# Patient Record
Sex: FEMALE | Race: OTHER | HISPANIC OR LATINO
[De-identification: names, ages, dates, MRNs, and addresses within clinical notes are randomized per-mention and may not be internally consistent; named-entity substitution may affect disease eponyms.]

---

## 2018-08-01 ENCOUNTER — APPOINTMENT (OUTPATIENT)
Dept: RHEUMATOLOGY | Facility: CLINIC | Age: 73
End: 2018-08-01
Payer: MEDICARE

## 2018-08-01 VITALS
RESPIRATION RATE: 17 BRPM | HEIGHT: 61 IN | HEART RATE: 62 BPM | OXYGEN SATURATION: 96 % | SYSTOLIC BLOOD PRESSURE: 138 MMHG | TEMPERATURE: 99.1 F | DIASTOLIC BLOOD PRESSURE: 60 MMHG | BODY MASS INDEX: 27.19 KG/M2 | WEIGHT: 144 LBS

## 2018-08-01 DIAGNOSIS — F17.200 NICOTINE DEPENDENCE, UNSPECIFIED, UNCOMPLICATED: ICD-10-CM

## 2018-08-01 DIAGNOSIS — Z87.39 PERSONAL HISTORY OF OTHER DISEASES OF THE MUSCULOSKELETAL SYSTEM AND CONNECTIVE TISSUE: ICD-10-CM

## 2018-08-01 DIAGNOSIS — Z86.79 PERSONAL HISTORY OF OTHER DISEASES OF THE CIRCULATORY SYSTEM: ICD-10-CM

## 2018-08-01 DIAGNOSIS — Z86.39 PERSONAL HISTORY OF OTHER ENDOCRINE, NUTRITIONAL AND METABOLIC DISEASE: ICD-10-CM

## 2018-08-01 PROCEDURE — 99205 OFFICE O/P NEW HI 60 MIN: CPT | Mod: 25

## 2018-08-01 PROCEDURE — 36415 COLL VENOUS BLD VENIPUNCTURE: CPT

## 2018-08-01 RX ORDER — HYDROCHLOROTHIAZIDE 25 MG/1
25 TABLET ORAL
Refills: 0 | Status: ACTIVE | COMMUNITY

## 2018-08-01 RX ORDER — ADHESIVE TAPE 3"X 2.3 YD
50 MCG TAPE, NON-MEDICATED TOPICAL
Refills: 0 | Status: ACTIVE | COMMUNITY

## 2018-08-01 RX ORDER — ATORVASTATIN CALCIUM 20 MG/1
20 TABLET, FILM COATED ORAL
Refills: 0 | Status: ACTIVE | COMMUNITY

## 2018-08-01 RX ORDER — LEVOTHYROXINE SODIUM 50 MCG
50 TABLET ORAL
Refills: 0 | Status: ACTIVE | COMMUNITY

## 2018-09-26 ENCOUNTER — APPOINTMENT (OUTPATIENT)
Dept: RHEUMATOLOGY | Facility: CLINIC | Age: 73
End: 2018-09-26
Payer: MEDICARE

## 2018-09-26 VITALS
HEIGHT: 61 IN | OXYGEN SATURATION: 97 % | SYSTOLIC BLOOD PRESSURE: 110 MMHG | TEMPERATURE: 99 F | HEART RATE: 68 BPM | DIASTOLIC BLOOD PRESSURE: 70 MMHG | WEIGHT: 142 LBS | RESPIRATION RATE: 16 BRPM | BODY MASS INDEX: 26.81 KG/M2

## 2018-09-26 DIAGNOSIS — M77.11 LATERAL EPICONDYLITIS, RIGHT ELBOW: ICD-10-CM

## 2018-09-26 DIAGNOSIS — M77.12 LATERAL EPICONDYLITIS, RIGHT ELBOW: ICD-10-CM

## 2018-09-26 PROCEDURE — 99214 OFFICE O/P EST MOD 30 MIN: CPT

## 2018-10-15 ENCOUNTER — RX RENEWAL (OUTPATIENT)
Age: 73
End: 2018-10-15

## 2019-01-24 LAB
A PHAGOCYTOPH IGG TITR SER IF: NORMAL TITER
ALBUMIN SERPL ELPH-MCNC: 5 G/DL
ALP BLD-CCNC: 92 U/L
ALT SERPL-CCNC: 14 U/L
ANA PAT FLD IF-IMP: ABNORMAL
ANA SER IF-ACNC: ABNORMAL
ANION GAP SERPL CALC-SCNC: 20 MMOL/L
AST SERPL-CCNC: 15 U/L
B BURGDOR AB SER QL IA: NEGATIVE
B BURGDOR AB SER-IMP: NEGATIVE
B BURGDOR IGM PATRN SER IB-IMP: NEGATIVE
B BURGDOR18/20KD IGM SER QL IB: NORMAL
B BURGDOR18KD IGG SER QL IB: NORMAL
B BURGDOR23KD IGG SER QL IB: NORMAL
B BURGDOR23KD IGM SER QL IB: NORMAL
B BURGDOR28KD AB SER QL IB: NORMAL
B BURGDOR28KD IGG SER QL IB: NORMAL
B BURGDOR30KD AB SER QL IB: NORMAL
B BURGDOR30KD IGG SER QL IB: NORMAL
B BURGDOR31KD IGG SER QL IB: NORMAL
B BURGDOR31KD IGM SER QL IB: NORMAL
B BURGDOR39KD IGG SER QL IB: NORMAL
B BURGDOR39KD IGM SER QL IB: PRESENT
B BURGDOR41KD IGG SER QL IB: PRESENT
B BURGDOR41KD IGM SER QL IB: NORMAL
B BURGDOR45KD AB SER QL IB: NORMAL
B BURGDOR45KD IGG SER QL IB: NORMAL
B BURGDOR58KD AB SER QL IB: NORMAL
B BURGDOR58KD IGG SER QL IB: NORMAL
B BURGDOR66KD IGG SER QL IB: NORMAL
B BURGDOR66KD IGM SER QL IB: NORMAL
B BURGDOR93KD IGG SER QL IB: NORMAL
B BURGDOR93KD IGM SER QL IB: NORMAL
B MICROTI IGG TITR SER: NORMAL TITER
BASOPHILS # BLD AUTO: 0.02 K/UL
BASOPHILS NFR BLD AUTO: 0.2 %
BILIRUB SERPL-MCNC: 0.9 MG/DL
BUN SERPL-MCNC: 18 MG/DL
CALCIUM SERPL-MCNC: 10.5 MG/DL
CCP AB SER IA-ACNC: <8 UNITS
CHLORIDE SERPL-SCNC: 100 MMOL/L
CO2 SERPL-SCNC: 24 MMOL/L
CREAT SERPL-MCNC: 0.69 MG/DL
CRP SERPL-MCNC: 0.15 MG/DL
E CHAFFEENSIS IGG TITR SER IF: NORMAL TITER
EOSINOPHIL # BLD AUTO: 0.13 K/UL
EOSINOPHIL NFR BLD AUTO: 1.3 %
ERYTHROCYTE [SEDIMENTATION RATE] IN BLOOD BY WESTERGREN METHOD: 8 MM/HR
GLUCOSE SERPL-MCNC: 93 MG/DL
HCT VFR BLD CALC: 46.6 %
HGB BLD-MCNC: 15.2 G/DL
IMM GRANULOCYTES NFR BLD AUTO: 0.2 %
LYMPHOCYTES # BLD AUTO: 2.48 K/UL
LYMPHOCYTES NFR BLD AUTO: 25.6 %
MAN DIFF?: NORMAL
MCHC RBC-ENTMCNC: 29.3 PG
MCHC RBC-ENTMCNC: 32.6 GM/DL
MCV RBC AUTO: 89.8 FL
MONOCYTES # BLD AUTO: 0.76 K/UL
MONOCYTES NFR BLD AUTO: 7.8 %
NEUTROPHILS # BLD AUTO: 6.28 K/UL
NEUTROPHILS NFR BLD AUTO: 64.9 %
PLATELET # BLD AUTO: 179 K/UL
POTASSIUM SERPL-SCNC: 4.6 MMOL/L
PROT SERPL-MCNC: 7.2 G/DL
RBC # BLD: 5.19 M/UL
RBC # FLD: 14 %
RF+CCP IGG SER-IMP: NEGATIVE
RHEUMATOID FACT SER QL: <10 IU/ML
SODIUM SERPL-SCNC: 144 MMOL/L
URATE SERPL-MCNC: 5 MG/DL
WBC # FLD AUTO: 9.69 K/UL

## 2019-03-20 ENCOUNTER — APPOINTMENT (OUTPATIENT)
Dept: RHEUMATOLOGY | Facility: CLINIC | Age: 74
End: 2019-03-20
Payer: MEDICARE

## 2019-03-20 VITALS
RESPIRATION RATE: 17 BRPM | DIASTOLIC BLOOD PRESSURE: 68 MMHG | HEIGHT: 61 IN | SYSTOLIC BLOOD PRESSURE: 110 MMHG | BODY MASS INDEX: 26.43 KG/M2 | TEMPERATURE: 99.8 F | WEIGHT: 140 LBS | HEART RATE: 96 BPM | OXYGEN SATURATION: 97 %

## 2019-03-20 DIAGNOSIS — G60.9 HEREDITARY AND IDIOPATHIC NEUROPATHY, UNSPECIFIED: ICD-10-CM

## 2019-03-20 DIAGNOSIS — M79.10 MYALGIA, UNSPECIFIED SITE: ICD-10-CM

## 2019-03-20 DIAGNOSIS — M79.7 FIBROMYALGIA: ICD-10-CM

## 2019-03-20 DIAGNOSIS — G43.709 CHRONIC MIGRAINE W/OUT AURA, NOT INTRACTABLE, W/OUT STATUS MIGRAINOSUS: ICD-10-CM

## 2019-03-20 PROCEDURE — 99214 OFFICE O/P EST MOD 30 MIN: CPT

## 2019-03-20 RX ORDER — METOPROLOL TARTRATE 25 MG/1
25 TABLET, FILM COATED ORAL
Refills: 0 | Status: DISCONTINUED | COMMUNITY
End: 2019-03-20

## 2019-03-20 RX ORDER — KETOPHENE
20 KIT TWICE DAILY
Qty: 1 | Refills: 0 | Status: DISCONTINUED | COMMUNITY
Start: 2018-09-26 | End: 2019-03-20

## 2019-03-20 RX ORDER — DULOXETINE HYDROCHLORIDE 30 MG/1
30 CAPSULE, DELAYED RELEASE PELLETS ORAL
Qty: 30 | Refills: 2 | Status: DISCONTINUED | COMMUNITY
Start: 2018-08-01 | End: 2019-03-20

## 2019-03-20 RX ORDER — MELOXICAM 15 MG/1
15 TABLET ORAL
Refills: 0 | Status: DISCONTINUED | COMMUNITY
End: 2019-03-20

## 2019-06-19 ENCOUNTER — APPOINTMENT (OUTPATIENT)
Dept: RHEUMATOLOGY | Facility: CLINIC | Age: 74
End: 2019-06-19
Payer: MEDICARE

## 2019-06-19 VITALS
WEIGHT: 140 LBS | SYSTOLIC BLOOD PRESSURE: 120 MMHG | TEMPERATURE: 98 F | OXYGEN SATURATION: 97 % | DIASTOLIC BLOOD PRESSURE: 46 MMHG | HEART RATE: 86 BPM | RESPIRATION RATE: 17 BRPM | BODY MASS INDEX: 26.43 KG/M2 | HEIGHT: 61 IN

## 2019-06-19 DIAGNOSIS — R53.82 CHRONIC FATIGUE, UNSPECIFIED: ICD-10-CM

## 2019-06-19 DIAGNOSIS — R10.31 RIGHT LOWER QUADRANT PAIN: ICD-10-CM

## 2019-06-19 DIAGNOSIS — M15.9 POLYOSTEOARTHRITIS, UNSPECIFIED: ICD-10-CM

## 2019-06-19 DIAGNOSIS — M79.7 FIBROMYALGIA: ICD-10-CM

## 2019-06-19 DIAGNOSIS — M89.8X9 OTHER SPECIFIED DISORDERS OF BONE, UNSPECIFIED SITE: ICD-10-CM

## 2019-06-19 DIAGNOSIS — M47.812 SPONDYLOSIS W/OUT MYELOPATHY OR RADICULOPATHY, CERVICAL REGION: ICD-10-CM

## 2019-06-19 PROCEDURE — 99214 OFFICE O/P EST MOD 30 MIN: CPT

## 2019-06-19 RX ORDER — CELECOXIB 200 MG/1
200 CAPSULE ORAL TWICE DAILY
Qty: 60 | Refills: 2 | Status: DISCONTINUED | COMMUNITY
Start: 2019-03-20 | End: 2019-06-19

## 2019-06-19 RX ORDER — DICLOFENAC SODIUM 10 MG/G
1 GEL TOPICAL TWICE DAILY
Qty: 1 | Refills: 2 | Status: ACTIVE | COMMUNITY
Start: 2019-06-19 | End: 1900-01-01

## 2019-07-19 RX ORDER — CYCLOBENZAPRINE HYDROCHLORIDE 10 MG/1
10 TABLET, FILM COATED ORAL
Qty: 60 | Refills: 2 | Status: ACTIVE | COMMUNITY
Start: 2019-06-19

## 2019-08-02 ENCOUNTER — APPOINTMENT (OUTPATIENT)
Dept: RHEUMATOLOGY | Facility: CLINIC | Age: 74
End: 2019-08-02

## 2019-08-21 ENCOUNTER — APPOINTMENT (OUTPATIENT)
Dept: RHEUMATOLOGY | Facility: CLINIC | Age: 74
End: 2019-08-21

## 2019-10-02 ENCOUNTER — APPOINTMENT (OUTPATIENT)
Dept: RHEUMATOLOGY | Facility: CLINIC | Age: 74
End: 2019-10-02

## 2019-10-25 ENCOUNTER — APPOINTMENT (OUTPATIENT)
Dept: RHEUMATOLOGY | Facility: CLINIC | Age: 74
End: 2019-10-25

## 2019-11-26 ENCOUNTER — APPOINTMENT (OUTPATIENT)
Dept: RHEUMATOLOGY | Facility: CLINIC | Age: 74
End: 2019-11-26

## 2020-10-03 DIAGNOSIS — Z01.818 ENCOUNTER FOR OTHER PREPROCEDURAL EXAMINATION: ICD-10-CM

## 2020-10-04 ENCOUNTER — APPOINTMENT (OUTPATIENT)
Dept: DISASTER EMERGENCY | Facility: CLINIC | Age: 75
End: 2020-10-04

## 2020-10-05 LAB — SARS-COV-2 N GENE NPH QL NAA+PROBE: NOT DETECTED

## 2020-10-06 NOTE — H&P PST ADULT - REASON FOR ADMISSION
Peripheral Angiogram with Dr. Gaspar Peripheral Angiogram with Dr. Gaspar due to severe claudication and pain

## 2020-10-06 NOTE — H&P PST ADULT - NSICDXPASTMEDICALHX_GEN_ALL_CORE_FT
PAST MEDICAL HISTORY:  Dyslipidemia     Fibromyalgia     GERD (gastroesophageal reflux disease)     Hypertension     Hypothyroid     Subclavian artery stenosis

## 2020-10-06 NOTE — H&P PST ADULT - ASSESSMENT
74 F with a significant PMH of hypertension, peripheral vascular disease, subclavian artery stenosis, hypothyroidism, dyslipidemia, fibromyalgia, GERD who presents today for anticipated peripheral angiogram with Dr. Gaspar.     - Consent to be obtained by physician  - 12 Lead EKG, Labs and imaging to be reviewed  - Procedure explained at length.  Risks v benefits reviewed.  All questions answered.

## 2020-10-06 NOTE — H&P PST ADULT - HISTORY OF PRESENT ILLNESS
Narrative:   This is a 74 F with a significant PMH of hypertension, peripheral vascular disease, subclavian artery stenosis, hypothyroidism, dyslipidemia, fibromyalgia, GERD who presents today for anticipated peripheral angiogram with Dr. Gaspar.  She was seen on 3/2020 by Dr. Gaspar for diagnostic peripheral angiogram and was found to have bilateral iliac ostial stenosis (bifurcation disease), patent femoral arteries and bilateral 3 vessel runoff.  Recommendation post-procedure was to pursue endovascular intervention of bilateral common iliac arteries (bifurcation stenting).      She had peripheral artrial duplex scanning performed 1/2020 which yielded biphasic flow of the R CFA suggesting 20-49% stenosis, popliteal artery with monophasic flow and mid posterior tibial with monophasic pattern and continuous flow in diastole which suggested significant stenosis.  The left CFA had monophasic flow with continuous diastolic component suggesting 50% stenosis, deep femoral artery, superficial femoral artery and popliteal artery with 20-49% stenosis and posterior tibial waveform with significant stenosis.      Venous reflux scan on 12/18/2019 with reflux in mid and distal right saphenous vein and L greater saphenous vein reflux proximally.      ASA  Mallampati  BRA  GFR   Narrative:   This is a 74 F with a significant PMH of hypertension, peripheral vascular disease, subclavian artery stenosis, hypothyroidism, dyslipidemia, fibromyalgia, GERD who presents today for anticipated peripheral angiogram with Dr. Gaspar.  She was seen on 3/2020 by Dr. Gaspar for diagnostic peripheral angiogram and was found to have bilateral iliac ostial stenosis (bifurcation disease), patent femoral arteries and bilateral 3 vessel runoff.  Recommendation post-procedure was to pursue endovascular intervention of bilateral common iliac arteries (bifurcation stenting).    Pt presents today with c/o progressive claudication and pain with ambulation normo- tensive  and appears comfortable   She denies fever, chills, diarrhea burning on urination . She continues to smoke 1/2 PPD  and has a chronic baseline cough  with no sputum production. She reports no chest pain, SOB , leg edema , black or blood in stool. She is allergic to ASA with throat swelling . As per chart review she was on Plavix but does not take now   with unclear reason why.           She had peripheral artrial duplex scanning performed 1/2020 which yielded biphasic flow of the R CFA suggesting 20-49% stenosis, popliteal artery with monophasic flow and mid posterior tibial with monophasic pattern and continuous flow in diastole which suggested significant stenosis.  The left CFA had monophasic flow with continuous diastolic component suggesting 50% stenosis, deep femoral artery, superficial femoral artery and popliteal artery with 20-49% stenosis and posterior tibial waveform with significant stenosis.      Venous reflux scan on 12/18/2019 with reflux in mid and distal right saphenous vein and L greater saphenous vein reflux proximally.      ASA2  Mallampati2  BRA 2.5   GFR 91

## 2020-10-07 ENCOUNTER — TRANSCRIPTION ENCOUNTER (OUTPATIENT)
Age: 75
End: 2020-10-07

## 2020-10-07 ENCOUNTER — OUTPATIENT (OUTPATIENT)
Dept: OUTPATIENT SERVICES | Facility: HOSPITAL | Age: 75
LOS: 1 days | Discharge: HOME | End: 2020-10-07
Payer: MEDICARE

## 2020-10-07 VITALS
OXYGEN SATURATION: 95 % | RESPIRATION RATE: 18 BRPM | SYSTOLIC BLOOD PRESSURE: 138 MMHG | DIASTOLIC BLOOD PRESSURE: 81 MMHG | HEART RATE: 59 BPM | TEMPERATURE: 98 F

## 2020-10-07 VITALS
HEART RATE: 66 BPM | RESPIRATION RATE: 18 BRPM | DIASTOLIC BLOOD PRESSURE: 68 MMHG | OXYGEN SATURATION: 99 % | SYSTOLIC BLOOD PRESSURE: 154 MMHG

## 2020-10-07 DIAGNOSIS — I73.9 PERIPHERAL VASCULAR DISEASE, UNSPECIFIED: ICD-10-CM

## 2020-10-07 LAB
ABO RH CONFIRMATION: SIGNIFICANT CHANGE UP
ALBUMIN SERPL ELPH-MCNC: 4.5 G/DL — SIGNIFICANT CHANGE UP (ref 3.3–5.2)
ALP SERPL-CCNC: 96 U/L — SIGNIFICANT CHANGE UP (ref 40–120)
ALT FLD-CCNC: 13 U/L — SIGNIFICANT CHANGE UP
ANION GAP SERPL CALC-SCNC: 11 MMOL/L — SIGNIFICANT CHANGE UP (ref 5–17)
APTT BLD: 31.5 SEC — SIGNIFICANT CHANGE UP (ref 27.5–35.5)
AST SERPL-CCNC: 13 U/L — SIGNIFICANT CHANGE UP
BASOPHILS # BLD AUTO: 0.05 K/UL — SIGNIFICANT CHANGE UP (ref 0–0.2)
BASOPHILS NFR BLD AUTO: 0.4 % — SIGNIFICANT CHANGE UP (ref 0–2)
BILIRUB SERPL-MCNC: 1 MG/DL — SIGNIFICANT CHANGE UP (ref 0.4–2)
BLD GP AB SCN SERPL QL: SIGNIFICANT CHANGE UP
BUN SERPL-MCNC: 21 MG/DL — HIGH (ref 8–20)
CALCIUM SERPL-MCNC: 9.8 MG/DL — SIGNIFICANT CHANGE UP (ref 8.6–10.2)
CHLORIDE SERPL-SCNC: 103 MMOL/L — SIGNIFICANT CHANGE UP (ref 98–107)
CO2 SERPL-SCNC: 28 MMOL/L — SIGNIFICANT CHANGE UP (ref 22–29)
CREAT SERPL-MCNC: 0.6 MG/DL — SIGNIFICANT CHANGE UP (ref 0.5–1.3)
EOSINOPHIL # BLD AUTO: 0.16 K/UL — SIGNIFICANT CHANGE UP (ref 0–0.5)
EOSINOPHIL NFR BLD AUTO: 1.3 % — SIGNIFICANT CHANGE UP (ref 0–6)
GLUCOSE SERPL-MCNC: 107 MG/DL — HIGH (ref 70–99)
HCT VFR BLD CALC: 48.6 % — HIGH (ref 34.5–45)
HGB BLD-MCNC: 15.9 G/DL — HIGH (ref 11.5–15.5)
IMM GRANULOCYTES NFR BLD AUTO: 0.4 % — SIGNIFICANT CHANGE UP (ref 0–1.5)
INR BLD: 1.06 RATIO — SIGNIFICANT CHANGE UP (ref 0.88–1.16)
LYMPHOCYTES # BLD AUTO: 17.1 % — SIGNIFICANT CHANGE UP (ref 13–44)
LYMPHOCYTES # BLD AUTO: 2.13 K/UL — SIGNIFICANT CHANGE UP (ref 1–3.3)
MAGNESIUM SERPL-MCNC: 2.1 MG/DL — SIGNIFICANT CHANGE UP (ref 1.6–2.6)
MCHC RBC-ENTMCNC: 29.9 PG — SIGNIFICANT CHANGE UP (ref 27–34)
MCHC RBC-ENTMCNC: 32.7 GM/DL — SIGNIFICANT CHANGE UP (ref 32–36)
MCV RBC AUTO: 91.5 FL — SIGNIFICANT CHANGE UP (ref 80–100)
MONOCYTES # BLD AUTO: 1.01 K/UL — HIGH (ref 0–0.9)
MONOCYTES NFR BLD AUTO: 8.1 % — SIGNIFICANT CHANGE UP (ref 2–14)
NEUTROPHILS # BLD AUTO: 9.08 K/UL — HIGH (ref 1.8–7.4)
NEUTROPHILS NFR BLD AUTO: 72.7 % — SIGNIFICANT CHANGE UP (ref 43–77)
PLATELET # BLD AUTO: 204 K/UL — SIGNIFICANT CHANGE UP (ref 150–400)
POTASSIUM SERPL-MCNC: 3.9 MMOL/L — SIGNIFICANT CHANGE UP (ref 3.5–5.3)
POTASSIUM SERPL-SCNC: 3.9 MMOL/L — SIGNIFICANT CHANGE UP (ref 3.5–5.3)
PROT SERPL-MCNC: 7.2 G/DL — SIGNIFICANT CHANGE UP (ref 6.6–8.7)
PROTHROM AB SERPL-ACNC: 12.3 SEC — SIGNIFICANT CHANGE UP (ref 10.6–13.6)
RBC # BLD: 5.31 M/UL — HIGH (ref 3.8–5.2)
RBC # FLD: 13.7 % — SIGNIFICANT CHANGE UP (ref 10.3–14.5)
SODIUM SERPL-SCNC: 142 MMOL/L — SIGNIFICANT CHANGE UP (ref 135–145)
WBC # BLD: 12.48 K/UL — HIGH (ref 3.8–10.5)
WBC # FLD AUTO: 12.48 K/UL — HIGH (ref 3.8–10.5)

## 2020-10-07 PROCEDURE — 85610 PROTHROMBIN TIME: CPT

## 2020-10-07 PROCEDURE — C1725: CPT

## 2020-10-07 PROCEDURE — 86850 RBC ANTIBODY SCREEN: CPT

## 2020-10-07 PROCEDURE — 99152 MOD SED SAME PHYS/QHP 5/>YRS: CPT

## 2020-10-07 PROCEDURE — C1769: CPT

## 2020-10-07 PROCEDURE — 86900 BLOOD TYPING SEROLOGIC ABO: CPT

## 2020-10-07 PROCEDURE — 36415 COLL VENOUS BLD VENIPUNCTURE: CPT

## 2020-10-07 PROCEDURE — C1760: CPT

## 2020-10-07 PROCEDURE — 93010 ELECTROCARDIOGRAM REPORT: CPT

## 2020-10-07 PROCEDURE — 85730 THROMBOPLASTIN TIME PARTIAL: CPT

## 2020-10-07 PROCEDURE — 93005 ELECTROCARDIOGRAM TRACING: CPT

## 2020-10-07 PROCEDURE — C1876: CPT

## 2020-10-07 PROCEDURE — 83735 ASSAY OF MAGNESIUM: CPT

## 2020-10-07 PROCEDURE — C1887: CPT

## 2020-10-07 PROCEDURE — C1894: CPT

## 2020-10-07 PROCEDURE — 86901 BLOOD TYPING SEROLOGIC RH(D): CPT

## 2020-10-07 PROCEDURE — 80053 COMPREHEN METABOLIC PANEL: CPT

## 2020-10-07 PROCEDURE — 37221: CPT | Mod: 50

## 2020-10-07 PROCEDURE — 99153 MOD SED SAME PHYS/QHP EA: CPT

## 2020-10-07 PROCEDURE — 85025 COMPLETE CBC W/AUTO DIFF WBC: CPT

## 2020-10-07 RX ORDER — CLOTRIMAZOLE 10 MG
0 TROCHE MUCOUS MEMBRANE
Qty: 0 | Refills: 0 | DISCHARGE

## 2020-10-07 RX ORDER — CLOPIDOGREL BISULFATE 75 MG/1
1 TABLET, FILM COATED ORAL
Qty: 90 | Refills: 3
Start: 2020-10-07 | End: 2021-10-01

## 2020-10-07 RX ORDER — ACETAMINOPHEN 500 MG
1000 TABLET ORAL ONCE
Refills: 0 | Status: COMPLETED | OUTPATIENT
Start: 2020-10-07 | End: 2020-10-07

## 2020-10-07 RX ORDER — CLOPIDOGREL BISULFATE 75 MG/1
300 TABLET, FILM COATED ORAL ONCE
Refills: 0 | Status: COMPLETED | OUTPATIENT
Start: 2020-10-07 | End: 2020-10-07

## 2020-10-07 RX ORDER — CLOPIDOGREL BISULFATE 75 MG/1
75 TABLET, FILM COATED ORAL DAILY
Refills: 0 | Status: DISCONTINUED | OUTPATIENT
Start: 2020-10-08 | End: 2020-10-21

## 2020-10-07 RX ORDER — SOD,AMMONIUM,POTASSIUM LACTATE
1 CREAM (GRAM) TOPICAL
Qty: 0 | Refills: 0 | DISCHARGE

## 2020-10-07 RX ORDER — CLOPIDOGREL BISULFATE 75 MG/1
1 TABLET, FILM COATED ORAL
Qty: 0 | Refills: 0 | DISCHARGE

## 2020-10-07 RX ADMIN — CLOPIDOGREL BISULFATE 300 MILLIGRAM(S): 75 TABLET, FILM COATED ORAL at 09:41

## 2020-10-07 RX ADMIN — Medication 400 MILLIGRAM(S): at 14:23

## 2020-10-07 NOTE — PROGRESS NOTE ADULT - SUBJECTIVE AND OBJECTIVE BOX
Department of Cardiology                                                                  [facility]/[place] [address] [place] [address]                                                            Telephone: [phone]. Fax:[phone]                                                                                         PERIPHERAL NOTE     74yFemale S/P lower extremity angiography as stated below.  The patient denies chest pain, SOB, leg/foot pain or groin pain.    s/p Bilateral kissing stents Left Lower Extremity   Iliac: stent  Right Lower Extremity      Iliac: stent done via RFA and LFA tolerated well           HPI:This is a 74 F with a significant PMH of hypertension, peripheral vascular disease, subclavian artery stenosis, hypothyroidism, dyslipidemia, fibromyalgia, GERD who presents today for anticipated peripheral angiogram with Dr. Gaspar.  She was seen on 3/2020 by Dr. Gaspar for diagnostic peripheral angiogram and was found to have bilateral iliac ostial stenosis (bifurcation disease), patent femoral arteries and bilateral 3 vessel runoff.  Recommendation post-procedure was to pursue endovascular intervention of bilateral common iliac arteries (bifurcation stenting).    Pt presents today with c/o progressive claudication and pain with ambulation normo- tensive  and appears comfortable   She denies fever, chills, diarrhea burning on urination . She continues to smoke 1/2 PPD  and has a chronic baseline cough  with no sputum production. She reports no chest pain, SOB , leg edema , black or blood in stool. She is allergic to ASA with throat swelling . As per chart review she was on Plavix but does not take now   with unclear reason why.           She had peripheral artrial duplex scanning performed 1/2020 which yielded biphasic flow of the R CFA suggesting 20-49% stenosis, popliteal artery with monophasic flow and mid posterior tibial with monophasic pattern and continuous flow in diastole which suggested significant stenosis.  The left CFA had monophasic flow with continuous diastolic component suggesting 50% stenosis, deep femoral artery, superficial femoral artery and popliteal artery with 20-49% stenosis and posterior tibial waveform with significant stenosis.      Venous reflux scan on 12/18/2019 with reflux in mid and distal right saphenous vein and L greater saphenous vein reflux proximally.      A  General: Awake, alert, oriented, in no acute distress   Chest: CTA, S1, S2, RRR  Right Groin: Right femoral artery sheath, soft, no bleeding, no hematoma  Left femoral artery hemostasis with a  angio-seal   Extremities: No edema  Pulses:        Right: positive        Left: positive     Labs:                         15.9   12.48 )-----------( 204      ( 07 Oct 2020 08:00 )             48.6     10-07    142  |  103  |  21.0<H>  ----------------------------<  107<H>  3.9   |  28.0  |  0.60    Ca    9.8      07 Oct 2020 08:00  Mg     2.1     10-07    TPro  7.2  /  Alb  4.5  /  TBili  1.0  /  DBili  x   /  AST  13  /  ALT  13  /  AlkPhos  96  10-07    PT/INR - ( 07 Oct 2020 08:00 )   PT: 12.3 sec;   INR: 1.06 ratio         PTT - ( 07 Oct 2020 08:00 )  PTT:31.5 sec

## 2020-10-07 NOTE — PROGRESS NOTE ADULT - SUBJECTIVE AND OBJECTIVE BOX
NP sheath removal note     s/p Pt tolerated R femoral # 6    sheath removal , manual pressure held for  35   with good hemostasis, no evidence of bleeding area remains soft non- tender , no hematoma + peripheral pulses  Left femoral cath site hemostasis with Angio seal device arjun amout of bleeding around site ignacio pressure held and D - Stat applied with good results     T(C): 36.4 (10-07-20 @ 08:01), Max: 36.4 (10-07-20 @ 08:01)  HR: 50 (10-07-20 @ 14:00) (43 - 59)  BP: 147/65 (10-07-20 @ 14:00) (95/48 - 147/72)  RR: 16 (10-07-20 @ 14:00) (16 - 18)  SpO2: 100% (10-07-20 @ 14:00) (95% - 100%)    A/P PVD  with 2 stent to bilateral iliacs   - Maintain bedrest for 4    hour   - OOB with assistance   - Post procedure vital, neuro and site checks as per routine    - -Pt will receive one of the following for dischagre Brilenta, Effient , Plavix   - plan for discharge if stable

## 2020-10-07 NOTE — DISCHARGE NOTE PROVIDER - CARE PROVIDER_API CALL
Isaiah Gaspar (MD)  Cardiovascular Disease  [address], [address]  [place] [address]  Phone: [phone]  Fax: [phone]  Follow Up Time:

## 2020-10-07 NOTE — DISCHARGE NOTE PROVIDER - NSDCMRMEDTOKEN_GEN_ALL_CORE_FT
ammonium lactate 12% topical lotion: Apply topically to affected area 2 times a day  atorvastatin 20 mg oral tablet: 1 tab(s) orally once a day  clopidogrel 75 mg oral tablet: 1 tab(s) orally once a day  hydroCHLOROthiazide 25 mg oral tablet: 1 tab(s) orally once a day  levothyroxine 50 mcg (0.05 mg) oral tablet: 1 tab(s) orally once a day  losartan 50 mg oral tablet: 1 tab(s) orally once a day  tiZANidine 4 mg oral tablet: orally 3 times a day  Vitamin D3 1000 intl units (25 mcg) oral tablet: 2 tab(s) orally once a day  Voltaren 1% topical gel:

## 2020-10-07 NOTE — DISCHARGE NOTE PROVIDER - HOSPITAL COURSE
74yFemale S/P lower extremity angiography as stated below.  The patient denies chest pain, SOB, leg/foot pain or groin pain.    s/p Bilateral kissing stents Left Lower Extremity   Iliac: stent  Right Lower Extremity      Iliac: stent done via RFA and LFA tolerated well           HPI:This is a 74 F with a significant PMH of hypertension, peripheral vascular disease, subclavian artery stenosis, hypothyroidism, dyslipidemia, fibromyalgia, GERD who presents today for anticipated peripheral angiogram with Dr. Gaspar.  She was seen on 3/2020 by Dr. Gaspar for diagnostic peripheral angiogram and was found to have bilateral iliac ostial stenosis (bifurcation disease), patent femoral arteries and bilateral 3 vessel runoff.  Recommendation post-procedure was to pursue endovascular intervention of bilateral common iliac arteries (bifurcation stenting).    Pt presents today with c/o progressive claudication and pain with ambulation normo- tensive  and appears comfortable   She denies fever, chills, diarrhea burning on urination . She continues to smoke 1/2 PPD  and has a chronic baseline cough  with no sputum production. She reports no chest pain, SOB , leg edema , black or blood in stool. She is allergic to ASA with throat swelling . As per chart review she was on Plavix but does not take now   with unclear reason why.           She had peripheral artrial duplex scanning performed 1/2020 which yielded biphasic flow of the R CFA suggesting 20-49% stenosis, popliteal artery with monophasic flow and mid posterior tibial with monophasic pattern and continuous flow in diastole which suggested significant stenosis.  The left CFA had monophasic flow with continuous diastolic component suggesting 50% stenosis, deep femoral artery, superficial femoral artery and popliteal artery with 20-49% stenosis and posterior tibial waveform with significant stenosis.      Venous reflux scan on 12/18/2019 with reflux in mid and distal right saphenous vein and L greater saphenous vein reflux proximally.  a/p PVD/ claudication s/p bilateral iliac stent     Post orders and observations   sheath removed 2 hours post   bedrest for 4   OOB if hemostasis maintained   Groin precautions  No Aspirin due to Anaphylaxis  Plavix loaded pre- procedure   Plavix 75 mg po daily to start 10/8   smoking cessation reinforced   Support given   Continue current medications   Follow up with Dr Gaspar 1 week post discharge

## 2020-10-07 NOTE — DISCHARGE NOTE NURSING/CASE MANAGEMENT/SOCIAL WORK - PATIENT PORTAL LINK FT
You can access the FollowMyHealth Patient Portal offered by [facility] by registering at the following website: [email or web address]. By joining [facility]’s FollowMyHealth portal, you will also be able to view your health information using other applications (apps) compatible with our system.

## 2020-10-07 NOTE — DISCHARGE NOTE PROVIDER - NSDCCPCAREPLAN_GEN_ALL_CORE_FT
PRINCIPAL DISCHARGE DIAGNOSIS  Diagnosis: Peripheral artery disease  Assessment and Plan of Treatment: Bilateral iliac stents   Take plavix( Clopidogrel) daily   groin precautions   follow up with Cardiologist 1 week

## 2020-10-07 NOTE — PROGRESS NOTE ADULT - ASSESSMENT
74yFemale S/P lower extremity angiography as stated below.  The patient denies chest pain, SOB, leg/foot pain or groin pain.    s/p Bilateral kissing stents Left Lower Extremity   Iliac: stent  Right Lower Extremity      Iliac: stent done via RFA and LFA tolerated well       a/p PVD/ claudication s/p bilateral iliac stent     Post orders and observations   sheath removed 2 hours post   bedrest for 4   OOB if hemostasis maintained   Groin precautions  No Aspirin due to Anaphylaxis  Plavix loaded pre- procedure   Plavix 75 mg po daily to start 10/8   smoking cessation reinforced   Support given   Continue current medications   Follow up with Dr Gaspar 1 week post discharge

## 2021-06-24 PROBLEM — E03.9 HYPOTHYROIDISM, UNSPECIFIED: Chronic | Status: ACTIVE | Noted: 2020-10-06

## 2021-06-24 PROBLEM — I77.1 STRICTURE OF ARTERY: Chronic | Status: ACTIVE | Noted: 2020-10-06

## 2021-06-24 PROBLEM — K21.9 GASTRO-ESOPHAGEAL REFLUX DISEASE WITHOUT ESOPHAGITIS: Chronic | Status: ACTIVE | Noted: 2020-10-06

## 2021-06-24 PROBLEM — I10 ESSENTIAL (PRIMARY) HYPERTENSION: Chronic | Status: ACTIVE | Noted: 2020-10-06

## 2021-06-24 PROBLEM — M79.7 FIBROMYALGIA: Chronic | Status: ACTIVE | Noted: 2020-10-06

## 2021-06-24 PROBLEM — E78.5 HYPERLIPIDEMIA, UNSPECIFIED: Chronic | Status: ACTIVE | Noted: 2020-10-06

## 2021-07-19 ENCOUNTER — APPOINTMENT (OUTPATIENT)
Dept: ORTHOPEDIC SURGERY | Facility: CLINIC | Age: 76
End: 2021-07-19
Payer: MEDICARE

## 2021-07-19 VITALS
SYSTOLIC BLOOD PRESSURE: 113 MMHG | WEIGHT: 140 LBS | DIASTOLIC BLOOD PRESSURE: 70 MMHG | HEIGHT: 61 IN | HEART RATE: 67 BPM | BODY MASS INDEX: 26.43 KG/M2

## 2021-07-19 PROCEDURE — 73030 X-RAY EXAM OF SHOULDER: CPT | Mod: RT

## 2021-07-19 PROCEDURE — 99204 OFFICE O/P NEW MOD 45 MIN: CPT

## 2021-07-19 NOTE — ADDENDUM
[FreeTextEntry1] : Documented by Robbie Ashley acting as a scribe for Dr. Marks on 07/19/2021. \par \par All medical record entries made by the Scribe were at my, Dr. Marks's, direction and\par personally dictated by me on 07/19/2021. I have reviewed the chart and agree that the record\par accurately reflects my personal performance of the history, physical exam, procedure and imaging.

## 2021-07-19 NOTE — DISCUSSION/SUMMARY
[de-identified] : ROLANDA DUNAWAY is a 75 year female being seen for initial visit R shoulder pain. She localizes pain over anterior shoulder specifically over rotator cuff muscle and AC joint. She reports chronic pain, that is described as achy pain. She saw doctor at  in Nov 2020 who suggested cortisone injection. She then tried treating it conservatively with physical therapy. She reports PT exacerbated her symptoms. She saw pain management doctor who injected lidocaine with moderate relief. \par \par Patient has tried and failed all conservative treatment options including the activity modification, HEP, PT, cortisone injections and NSAIDs. Patient is considering surgical treatment. All the risks and benefits of a conventional vs reverse shoulder replacement were discussed with the patient. Patient is prime candidate for reverse shoulder replacement. Risks include, but are not limited to, infection, bleeding, dislocation, nerve injury, blood clots and other possible medical complications, which were discussed with the patient. Also the risks of possible readmission were discussed with the patient. Patient completely understands all risks and benefits. The need for postoperative DVT prophylaxis discussed with the patient as well. The patient was given no assurances that all the symptoms will be alleviated. I had my surgical coordinator Qasim meet with pt to go over dates to schedule this procedure. All questions were answered and the patient verbalized understanding. The patient is in agreement with this treatment plan. \par

## 2021-07-19 NOTE — HISTORY OF PRESENT ILLNESS
[de-identified] : ROLANDA DUNAWAY is a 75 year female being seen for initial visit R shoulder pain. She localizes pain over anterior shoulder specifically over rotator cuff muscle and AC joint. She reports chronic pain, that is described as achy pain. She saw doctor at  in Nov 2020 who suggested cortisone injection. She then tried treating it conservatively with physical therapy. She reports PT exacerbated her symptoms. She saw pain management doctor who injected lidocaine with moderate relief. \par \par

## 2021-07-19 NOTE — PHYSICAL EXAM
[de-identified] : Physical Exam:\par General: Well appearing, no acute distress\par Neurologic: A&Ox3, No focal deficits\par Head: NCAT without abrasions, lacerations, or ecchymosis to head, face, or scalp\par Eyes: No scleral icterus, no gross abnormalities\par Respiratory: Equal chest wall expansion bilaterally, no accessory muscle use\par Lymphatic: No lymphadenopathy palpated\par Skin: Warm and dry\par Psychiatric: Normal mood and affect\par \par Right Shoulder\par ·	Inspection/Palpation: no tenderness, swelling or deformities\par ·	Range of Motion: no crepitus with ROM; Active FF 0 - 80; ER at side 0 - 15; IR to belly ; Passive FF 0 - 90 w/ pain ; ER at side 0 - 20 w/ pain  \par ·	Strength: forward elevation in scapular plane 4/5, internal rotation 4/5, external rotation 4/5, adduction 4/5 and abduction 4/5\par ·	Stability: no joint instability on provocative testing\par ·	Tests: Unable to test \par \par Left Shoulder\par ·	Inspection/Palpation: no tenderness, swelling or deformities\par ·	Range of Motion: full and painless in all planes, no crepitus\par ·	Strength: forward elevation in scapular plane 5/5, internal rotation 5/5, external rotation 5/5, adduction 5/5 and abduction 5/5\par ·	Stability: no joint instability on provocative testing\par ·	Tests: Shultz test negative, Neer sign negative, negative drop arm test secondary to pain, bear hug test negative, Napolean sign negative, cross arm adduction negative, lift off sign positive, hornblowers sign negative, speeds test negative, Yergason's test negative, no bicipital groove tenderness, Chen's Active Compression test negative  [de-identified] : 4 views of R shoulder were performed today and available for me to review. Results were discussed with the patient. They demonstrate rotator cuff arthropathy with severe arthritis, no f/x, dislocation or other deformity.\par \par MRI RIGHT SHOULDER WITHOUT CONTRAST\par 10/05/2020\par [facility]\par \par Impression: \par \par Chronic appearing rotator cuff tear involving the full width of the infraspinatus and supraspinatus tendon measuring at least 4.5 x 4.2 cm with moderate supraspinatus muscle atrophy and mild infraspinatus muscle atrophy.\par \par Severe subscapularis tendinosis and intraarticular bicipital tendinosis without a tear.\par \par Superior humeral head and displacement with mild to moderate glenohumeral osteoarthritis. \par

## 2021-08-13 ENCOUNTER — OUTPATIENT (OUTPATIENT)
Dept: OUTPATIENT SERVICES | Facility: HOSPITAL | Age: 76
LOS: 1 days | End: 2021-08-13
Payer: MEDICARE

## 2021-08-13 VITALS
RESPIRATION RATE: 16 BRPM | DIASTOLIC BLOOD PRESSURE: 59 MMHG | HEART RATE: 68 BPM | HEIGHT: 61 IN | SYSTOLIC BLOOD PRESSURE: 156 MMHG | OXYGEN SATURATION: 98 % | WEIGHT: 125 LBS | TEMPERATURE: 99 F

## 2021-08-13 DIAGNOSIS — Z29.9 ENCOUNTER FOR PROPHYLACTIC MEASURES, UNSPECIFIED: ICD-10-CM

## 2021-08-13 DIAGNOSIS — Z98.890 OTHER SPECIFIED POSTPROCEDURAL STATES: Chronic | ICD-10-CM

## 2021-08-13 DIAGNOSIS — M19.011 PRIMARY OSTEOARTHRITIS, RIGHT SHOULDER: ICD-10-CM

## 2021-08-13 DIAGNOSIS — M25.511 PAIN IN RIGHT SHOULDER: ICD-10-CM

## 2021-08-13 DIAGNOSIS — Z98.891 HISTORY OF UTERINE SCAR FROM PREVIOUS SURGERY: Chronic | ICD-10-CM

## 2021-08-13 DIAGNOSIS — Z90.49 ACQUIRED ABSENCE OF OTHER SPECIFIED PARTS OF DIGESTIVE TRACT: Chronic | ICD-10-CM

## 2021-08-13 LAB
ALBUMIN SERPL ELPH-MCNC: 3.7 G/DL — SIGNIFICANT CHANGE UP (ref 3.3–5)
ANION GAP SERPL CALC-SCNC: 1 MMOL/L — LOW (ref 5–17)
APTT BLD: 30.6 SEC — SIGNIFICANT CHANGE UP (ref 27.5–35.5)
BASOPHILS # BLD AUTO: 0.04 K/UL — SIGNIFICANT CHANGE UP (ref 0–0.2)
BASOPHILS NFR BLD AUTO: 0.5 % — SIGNIFICANT CHANGE UP (ref 0–2)
BUN SERPL-MCNC: 16 MG/DL — SIGNIFICANT CHANGE UP (ref 7–23)
CALCIUM SERPL-MCNC: 9.5 MG/DL — SIGNIFICANT CHANGE UP (ref 8.5–10.1)
CHLORIDE SERPL-SCNC: 112 MMOL/L — HIGH (ref 96–108)
CO2 SERPL-SCNC: 29 MMOL/L — SIGNIFICANT CHANGE UP (ref 22–31)
CREAT SERPL-MCNC: 0.95 MG/DL — SIGNIFICANT CHANGE UP (ref 0.5–1.3)
EOSINOPHIL # BLD AUTO: 0.08 K/UL — SIGNIFICANT CHANGE UP (ref 0–0.5)
EOSINOPHIL NFR BLD AUTO: 0.9 % — SIGNIFICANT CHANGE UP (ref 0–6)
GLUCOSE SERPL-MCNC: 105 MG/DL — HIGH (ref 70–99)
HCT VFR BLD CALC: 44.9 % — SIGNIFICANT CHANGE UP (ref 34.5–45)
HGB BLD-MCNC: 14.2 G/DL — SIGNIFICANT CHANGE UP (ref 11.5–15.5)
IMM GRANULOCYTES NFR BLD AUTO: 0.2 % — SIGNIFICANT CHANGE UP (ref 0–1.5)
INR BLD: 1.1 RATIO — SIGNIFICANT CHANGE UP (ref 0.88–1.16)
LYMPHOCYTES # BLD AUTO: 1.7 K/UL — SIGNIFICANT CHANGE UP (ref 1–3.3)
LYMPHOCYTES # BLD AUTO: 19.4 % — SIGNIFICANT CHANGE UP (ref 13–44)
MCHC RBC-ENTMCNC: 29.6 PG — SIGNIFICANT CHANGE UP (ref 27–34)
MCHC RBC-ENTMCNC: 31.6 GM/DL — LOW (ref 32–36)
MCV RBC AUTO: 93.5 FL — SIGNIFICANT CHANGE UP (ref 80–100)
MONOCYTES # BLD AUTO: 0.65 K/UL — SIGNIFICANT CHANGE UP (ref 0–0.9)
MONOCYTES NFR BLD AUTO: 7.4 % — SIGNIFICANT CHANGE UP (ref 2–14)
NEUTROPHILS # BLD AUTO: 6.27 K/UL — SIGNIFICANT CHANGE UP (ref 1.8–7.4)
NEUTROPHILS NFR BLD AUTO: 71.6 % — SIGNIFICANT CHANGE UP (ref 43–77)
PLATELET # BLD AUTO: 177 K/UL — SIGNIFICANT CHANGE UP (ref 150–400)
POTASSIUM SERPL-MCNC: 4.3 MMOL/L — SIGNIFICANT CHANGE UP (ref 3.5–5.3)
POTASSIUM SERPL-SCNC: 4.3 MMOL/L — SIGNIFICANT CHANGE UP (ref 3.5–5.3)
PROTHROM AB SERPL-ACNC: 12.7 SEC — SIGNIFICANT CHANGE UP (ref 10.6–13.6)
RBC # BLD: 4.8 M/UL — SIGNIFICANT CHANGE UP (ref 3.8–5.2)
RBC # FLD: 13.6 % — SIGNIFICANT CHANGE UP (ref 10.3–14.5)
SODIUM SERPL-SCNC: 142 MMOL/L — SIGNIFICANT CHANGE UP (ref 135–145)
WBC # BLD: 8.76 K/UL — SIGNIFICANT CHANGE UP (ref 3.8–10.5)
WBC # FLD AUTO: 8.76 K/UL — SIGNIFICANT CHANGE UP (ref 3.8–10.5)

## 2021-08-13 PROCEDURE — 86850 RBC ANTIBODY SCREEN: CPT

## 2021-08-13 PROCEDURE — 93005 ELECTROCARDIOGRAM TRACING: CPT

## 2021-08-13 PROCEDURE — 85610 PROTHROMBIN TIME: CPT

## 2021-08-13 PROCEDURE — 93010 ELECTROCARDIOGRAM REPORT: CPT

## 2021-08-13 PROCEDURE — 83036 HEMOGLOBIN GLYCOSYLATED A1C: CPT

## 2021-08-13 PROCEDURE — G0463: CPT | Mod: 25

## 2021-08-13 PROCEDURE — 82040 ASSAY OF SERUM ALBUMIN: CPT

## 2021-08-13 PROCEDURE — 87641 MR-STAPH DNA AMP PROBE: CPT

## 2021-08-13 PROCEDURE — 36415 COLL VENOUS BLD VENIPUNCTURE: CPT

## 2021-08-13 PROCEDURE — 86900 BLOOD TYPING SEROLOGIC ABO: CPT

## 2021-08-13 PROCEDURE — 80048 BASIC METABOLIC PNL TOTAL CA: CPT

## 2021-08-13 PROCEDURE — 87640 STAPH A DNA AMP PROBE: CPT

## 2021-08-13 PROCEDURE — 85730 THROMBOPLASTIN TIME PARTIAL: CPT

## 2021-08-13 PROCEDURE — 85025 COMPLETE CBC W/AUTO DIFF WBC: CPT

## 2021-08-13 PROCEDURE — 86901 BLOOD TYPING SEROLOGIC RH(D): CPT

## 2021-08-13 RX ORDER — SOD,AMMONIUM,POTASSIUM LACTATE
1 CREAM (GRAM) TOPICAL
Qty: 0 | Refills: 0 | DISCHARGE

## 2021-08-13 RX ORDER — TIZANIDINE 4 MG/1
0 TABLET ORAL
Qty: 0 | Refills: 0 | DISCHARGE

## 2021-08-13 RX ORDER — CHOLECALCIFEROL (VITAMIN D3) 125 MCG
2 CAPSULE ORAL
Qty: 0 | Refills: 0 | DISCHARGE

## 2021-08-13 RX ORDER — DICLOFENAC SODIUM 30 MG/G
0 GEL TOPICAL
Qty: 0 | Refills: 0 | DISCHARGE

## 2021-08-13 NOTE — H&P PST ADULT - NSICDXPASTMEDICALHX_GEN_ALL_CORE_FT
PAST MEDICAL HISTORY:  Arthritis     Carotid stenosis     Claudication     COVID-19 vaccine series completed Moderna 4/5/2021    Dyslipidemia     Fibromyalgia     GERD (gastroesophageal reflux disease)     Herniated cervical disc ROM intact    Hypertension     Hypothyroid     PVD (peripheral vascular disease)     Smoker     Subclavian artery stenosis

## 2021-08-13 NOTE — H&P PST ADULT - ASSESSMENT
75 year old female with OA right shoulder c/o right shoulder pain with ROM; limited ROM due to pain she presents to PST for planned reverse right shoulder replacement     Plan:  1. PST instructions given ; NPO status instructions to be given by ASU   2. Pt instructed to take following meds with sip of water : losartan levothyroxine  3. Pt instructed to take routine evening medications unless indicated   4. Stop NSAIDS ( Aspirin Alev Motrin Mobic Diclofenac), herbal supplements , MVI , Vitamin fish oil 7 days prior to surgery  unless   directed by surgeon or cardiologist;   5. Medical Optimization  with Dr Pettit   6. EZ wash instructions given & mupirocin instructions given  7. Labs EKG  as per surgeon request   8. Pt instructed to self quarantine after Covid test   9. Covid Testing scheduled Pt notified and aware  10. Pt denies covid symptoms shortness of breath fever cough       CAPRINI SCORE [CLOT]    AGE RELATED RISK FACTORS                                                       MOBILITY RELATED FACTORS  [ ] Age 41-60 years                                            (1 Point)                  [ ] Bed rest                                                        (1 Point)  [ x] Age: 61-74 years                                           (2 Points)                 [ ] Plaster cast                                                   (2 Points)  [ ] Age= 75 years                                              (3 Points)                 [ ] Bed bound for more than 72 hours                 (2 Points)    DISEASE RELATED RISK FACTORS                                               GENDER SPECIFIC FACTORS  [ ] Edema in the lower extremities                       (1 Point)                  [ ] Pregnancy                                                     (1 Point)  [ ] Varicose veins                                               (1 Point)                  [ ] Post-partum < 6 weeks                                   (1 Point)             [ ] BMI > 25 Kg/m2                                            (1 Point)                  [ ] Hormonal therapy  or oral contraception          (1 Point)                 [ ] Sepsis (in the previous month)                        (1 Point)                  [ ] History of pregnancy complications                 (1 point)  [ ] Pneumonia or serious lung disease                                               [ ] Unexplained or recurrent                     (1 Point)           (in the previous month)                               (1 Point)  [ ] Abnormal pulmonary function test                     (1 Point)                 SURGERY RELATED RISK FACTORS  [ ] Acute myocardial infarction                              (1 Point)                 [ ]  Section                                             (1 Point)  [ ] Congestive heart failure (in the previous month)  (1 Point)               [ ] Minor surgery                                                  (1 Point)   [ ] Inflammatory bowel disease                             (1 Point)                 [ ] Arthroscopic surgery                                        (2 Points)  [ ] Central venous access                                      (2 Points)                [ ] General surgery lasting more than 45 minutes   (2 Points)       [ ] Stroke (in the previous month)                          (5 Points)               [ x] Elective arthroplasty                                         (5 Points)            ( ) past and present malignancy                             ( 2 points)                                                                                                                                    HEMATOLOGY RELATED FACTORS                                                 TRAUMA RELATED RISK FACTORS  [ ] Prior episodes of VTE                                     (3 Points)                 [ ] Fracture of the hip, pelvis, or leg                       (5 Points)  [ ] Positive family history for VTE                         (3 Points)                 [ ] Acute spinal cord injury (in the previous month)  (5 Points)  [ ] Prothrombin 18888 A                                     (3 Points)                 [ ] Paralysis  (less than 1 month)                             (5 Points)  [ ] Factor V Leiden                                             (3 Points)                  [ ] Multiple Trauma within 1 month                        (5 Points)  [ ] Lupus anticoagulants                                     (3 Points)                                                           [ ] Anticardiolipin antibodies                               (3 Points)                                                       [ ] High homocysteine in the blood                      (3 Points)                                             [ ] Other congenital or acquired thrombophilia      (3 Points)                                                [ ] Heparin induced thrombocytopenia                  (3 Points)                                          Total Score [     7     ]

## 2021-08-13 NOTE — H&P PST ADULT - HISTORY OF PRESENT ILLNESS
Narrative:   This is a 74 F with a significant PMH of hypertension, peripheral vascular disease, subclavian artery stenosis, hypothyroidism, dyslipidemia, fibromyalgia, GERD who presents today for anticipated peripheral angiogram with Dr. aGspar.  She was seen on 3/2020 by Dr. Gaspar for diagnostic peripheral angiogram and was found to have bilateral iliac ostial stenosis (bifurcation disease), patent femoral arteries and bilateral 3 vessel runoff.  Recommendation post-procedure was to pursue endovascular intervention of bilateral common iliac arteries (bifurcation stenting).    Pt presents today with c/o progressive claudication and pain with ambulation normo- tensive  and appears comfortable   She denies fever, chills, diarrhea burning on urination . She continues to smoke 1/2 PPD  and has a chronic baseline cough  with no sputum production. She reports no chest pain, SOB , leg edema , black or blood in stool. She is allergic to ASA with throat swelling . As per chart review she was on Plavix but does not take now   with unclear reason why.           She had peripheral artrial duplex scanning performed 1/2020 which yielded biphasic flow of the R CFA suggesting 20-49% stenosis, popliteal artery with monophasic flow and mid posterior tibial with monophasic pattern and continuous flow in diastole which suggested significant stenosis.  The left CFA had monophasic flow with continuous diastolic component suggesting 50% stenosis, deep femoral artery, superficial femoral artery and popliteal artery with 20-49% stenosis and posterior tibial waveform with significant stenosis.      Venous reflux scan on 12/18/2019 with reflux in mid and distal right saphenous vein and L greater saphenous vein reflux proximally.      ASA2  Mallampati2  BRA 2.5   GFR 91    75 year old female with OA right shoulder c/o right shoulder pain with ROM; limited ROM due to pain she presents to PST for planned reverse right shoulder replacement

## 2021-08-13 NOTE — H&P PST ADULT - ATTENDING COMMENTS
Orthopedic Sports Attending:    Agree with above resident/PA note.  Note edited where necessary.      Patient seen and examined at bedside. Discussed the risks, complications, benefits and alternatives of care. Confirmed procedure and site. Patient fully understands and would like to proceed with surgery.    Venu Marks, DO  Orthopaedic Surgery

## 2021-08-13 NOTE — H&P PST ADULT - NSICDXPASTSURGICALHX_GEN_ALL_CORE_FT
PAST SURGICAL HISTORY:  H/O colonoscopy     H/O:      History of cholecystectomy 2004    S/P angiogram of extremity 2020 stent bilateral lower extremity

## 2021-08-14 DIAGNOSIS — M25.511 PAIN IN RIGHT SHOULDER: ICD-10-CM

## 2021-08-14 DIAGNOSIS — M19.011 PRIMARY OSTEOARTHRITIS, RIGHT SHOULDER: ICD-10-CM

## 2021-08-14 LAB
A1C WITH ESTIMATED AVERAGE GLUCOSE RESULT: 5.9 % — HIGH (ref 4–5.6)
ESTIMATED AVERAGE GLUCOSE: 123 MG/DL — HIGH (ref 68–114)
MRSA PCR RESULT.: SIGNIFICANT CHANGE UP
S AUREUS DNA NOSE QL NAA+PROBE: SIGNIFICANT CHANGE UP

## 2021-08-20 ENCOUNTER — APPOINTMENT (OUTPATIENT)
Dept: DISASTER EMERGENCY | Facility: CLINIC | Age: 76
End: 2021-08-20

## 2021-08-20 ENCOUNTER — APPOINTMENT (OUTPATIENT)
Dept: CT IMAGING | Facility: CLINIC | Age: 76
End: 2021-08-20
Payer: MEDICARE

## 2021-08-20 ENCOUNTER — OUTPATIENT (OUTPATIENT)
Dept: OUTPATIENT SERVICES | Facility: HOSPITAL | Age: 76
LOS: 1 days | End: 2021-08-20
Payer: MEDICARE

## 2021-08-20 DIAGNOSIS — Z98.890 OTHER SPECIFIED POSTPROCEDURAL STATES: Chronic | ICD-10-CM

## 2021-08-20 DIAGNOSIS — M25.511 PAIN IN RIGHT SHOULDER: ICD-10-CM

## 2021-08-20 DIAGNOSIS — Z90.49 ACQUIRED ABSENCE OF OTHER SPECIFIED PARTS OF DIGESTIVE TRACT: Chronic | ICD-10-CM

## 2021-08-20 DIAGNOSIS — Z98.891 HISTORY OF UTERINE SCAR FROM PREVIOUS SURGERY: Chronic | ICD-10-CM

## 2021-08-20 PROBLEM — Z92.29 PERSONAL HISTORY OF OTHER DRUG THERAPY: Chronic | Status: ACTIVE | Noted: 2021-08-13

## 2021-08-20 PROBLEM — I65.29 OCCLUSION AND STENOSIS OF UNSPECIFIED CAROTID ARTERY: Chronic | Status: ACTIVE | Noted: 2021-08-13

## 2021-08-20 PROBLEM — M50.20 OTHER CERVICAL DISC DISPLACEMENT, UNSPECIFIED CERVICAL REGION: Chronic | Status: ACTIVE | Noted: 2021-08-13

## 2021-08-20 PROBLEM — M19.90 UNSPECIFIED OSTEOARTHRITIS, UNSPECIFIED SITE: Chronic | Status: ACTIVE | Noted: 2021-08-13

## 2021-08-20 PROBLEM — F17.200 NICOTINE DEPENDENCE, UNSPECIFIED, UNCOMPLICATED: Chronic | Status: ACTIVE | Noted: 2021-08-13

## 2021-08-20 PROBLEM — I73.9 PERIPHERAL VASCULAR DISEASE, UNSPECIFIED: Chronic | Status: ACTIVE | Noted: 2021-08-13

## 2021-08-20 PROCEDURE — 73200 CT UPPER EXTREMITY W/O DYE: CPT

## 2021-08-20 PROCEDURE — 73200 CT UPPER EXTREMITY W/O DYE: CPT | Mod: 26,RT,MH

## 2021-08-20 RX ORDER — SODIUM CHLORIDE 9 MG/ML
1000 INJECTION, SOLUTION INTRAVENOUS
Refills: 0 | Status: DISCONTINUED | OUTPATIENT
Start: 2021-08-23 | End: 2021-08-23

## 2021-08-20 RX ORDER — ONDANSETRON 8 MG/1
4 TABLET, FILM COATED ORAL EVERY 6 HOURS
Refills: 0 | Status: DISCONTINUED | OUTPATIENT
Start: 2021-08-23 | End: 2021-08-23

## 2021-08-20 RX ORDER — FENTANYL CITRATE 50 UG/ML
50 INJECTION INTRAVENOUS
Refills: 0 | Status: DISCONTINUED | OUTPATIENT
Start: 2021-08-23 | End: 2021-08-23

## 2021-08-21 LAB — SARS-COV-2 N GENE NPH QL NAA+PROBE: NOT DETECTED

## 2021-08-23 ENCOUNTER — RESULT REVIEW (OUTPATIENT)
Age: 76
End: 2021-08-23

## 2021-08-23 ENCOUNTER — APPOINTMENT (OUTPATIENT)
Dept: ORTHOPEDIC SURGERY | Facility: HOSPITAL | Age: 76
End: 2021-08-23

## 2021-08-23 ENCOUNTER — TRANSCRIPTION ENCOUNTER (OUTPATIENT)
Age: 76
End: 2021-08-23

## 2021-08-23 ENCOUNTER — OUTPATIENT (OUTPATIENT)
Dept: INPATIENT UNIT | Facility: HOSPITAL | Age: 76
LOS: 1 days | Discharge: HOME | End: 2021-08-23
Payer: MEDICARE

## 2021-08-23 VITALS
OXYGEN SATURATION: 99 % | SYSTOLIC BLOOD PRESSURE: 126 MMHG | TEMPERATURE: 97 F | HEIGHT: 61 IN | RESPIRATION RATE: 14 BRPM | DIASTOLIC BLOOD PRESSURE: 65 MMHG | HEART RATE: 64 BPM | WEIGHT: 136.03 LBS

## 2021-08-23 DIAGNOSIS — M25.511 PAIN IN RIGHT SHOULDER: ICD-10-CM

## 2021-08-23 DIAGNOSIS — Z98.890 OTHER SPECIFIED POSTPROCEDURAL STATES: Chronic | ICD-10-CM

## 2021-08-23 DIAGNOSIS — Z90.49 ACQUIRED ABSENCE OF OTHER SPECIFIED PARTS OF DIGESTIVE TRACT: Chronic | ICD-10-CM

## 2021-08-23 DIAGNOSIS — M19.011 PRIMARY OSTEOARTHRITIS, RIGHT SHOULDER: ICD-10-CM

## 2021-08-23 DIAGNOSIS — Z98.891 HISTORY OF UTERINE SCAR FROM PREVIOUS SURGERY: Chronic | ICD-10-CM

## 2021-08-23 LAB
ANION GAP SERPL CALC-SCNC: 4 MMOL/L — LOW (ref 5–17)
BUN SERPL-MCNC: 19 MG/DL — SIGNIFICANT CHANGE UP (ref 7–23)
CALCIUM SERPL-MCNC: 8.7 MG/DL — SIGNIFICANT CHANGE UP (ref 8.5–10.1)
CHLORIDE SERPL-SCNC: 109 MMOL/L — HIGH (ref 96–108)
CO2 SERPL-SCNC: 27 MMOL/L — SIGNIFICANT CHANGE UP (ref 22–31)
CREAT SERPL-MCNC: 0.54 MG/DL — SIGNIFICANT CHANGE UP (ref 0.5–1.3)
GLUCOSE SERPL-MCNC: 164 MG/DL — HIGH (ref 70–99)
HCT VFR BLD CALC: 41 % — SIGNIFICANT CHANGE UP (ref 34.5–45)
HGB BLD-MCNC: 13.3 G/DL — SIGNIFICANT CHANGE UP (ref 11.5–15.5)
MCHC RBC-ENTMCNC: 30.1 PG — SIGNIFICANT CHANGE UP (ref 27–34)
MCHC RBC-ENTMCNC: 32.4 GM/DL — SIGNIFICANT CHANGE UP (ref 32–36)
MCV RBC AUTO: 92.8 FL — SIGNIFICANT CHANGE UP (ref 80–100)
PLATELET # BLD AUTO: 146 K/UL — LOW (ref 150–400)
POTASSIUM SERPL-MCNC: 3.6 MMOL/L — SIGNIFICANT CHANGE UP (ref 3.5–5.3)
POTASSIUM SERPL-SCNC: 3.6 MMOL/L — SIGNIFICANT CHANGE UP (ref 3.5–5.3)
RBC # BLD: 4.42 M/UL — SIGNIFICANT CHANGE UP (ref 3.8–5.2)
RBC # FLD: 13.7 % — SIGNIFICANT CHANGE UP (ref 10.3–14.5)
SODIUM SERPL-SCNC: 140 MMOL/L — SIGNIFICANT CHANGE UP (ref 135–145)
WBC # BLD: 9.9 K/UL — SIGNIFICANT CHANGE UP (ref 3.8–10.5)
WBC # FLD AUTO: 9.9 K/UL — SIGNIFICANT CHANGE UP (ref 3.8–10.5)

## 2021-08-23 PROCEDURE — C1776: CPT

## 2021-08-23 PROCEDURE — 97162 PT EVAL MOD COMPLEX 30 MIN: CPT | Mod: GP

## 2021-08-23 PROCEDURE — 73030 X-RAY EXAM OF SHOULDER: CPT | Mod: 26,RT

## 2021-08-23 PROCEDURE — C1713: CPT

## 2021-08-23 PROCEDURE — 80048 BASIC METABOLIC PNL TOTAL CA: CPT

## 2021-08-23 PROCEDURE — 23472 RECONSTRUCT SHOULDER JOINT: CPT | Mod: RT

## 2021-08-23 PROCEDURE — 97116 GAIT TRAINING THERAPY: CPT | Mod: GP

## 2021-08-23 PROCEDURE — 85027 COMPLETE CBC AUTOMATED: CPT

## 2021-08-23 PROCEDURE — 36415 COLL VENOUS BLD VENIPUNCTURE: CPT

## 2021-08-23 PROCEDURE — 73030 X-RAY EXAM OF SHOULDER: CPT | Mod: RT

## 2021-08-23 PROCEDURE — 82962 GLUCOSE BLOOD TEST: CPT

## 2021-08-23 PROCEDURE — 99223 1ST HOSP IP/OBS HIGH 75: CPT

## 2021-08-23 PROCEDURE — 97530 THERAPEUTIC ACTIVITIES: CPT | Mod: GP

## 2021-08-23 PROCEDURE — 99204 OFFICE O/P NEW MOD 45 MIN: CPT

## 2021-08-23 RX ORDER — CLOPIDOGREL BISULFATE 75 MG/1
75 TABLET, FILM COATED ORAL DAILY
Refills: 0 | Status: DISCONTINUED | OUTPATIENT
Start: 2021-08-23 | End: 2021-08-24

## 2021-08-23 RX ORDER — PROCHLORPERAZINE MALEATE 5 MG
10 TABLET ORAL EVERY 8 HOURS
Refills: 0 | Status: DISCONTINUED | OUTPATIENT
Start: 2021-08-23 | End: 2021-08-24

## 2021-08-23 RX ORDER — PANTOPRAZOLE SODIUM 20 MG/1
1 TABLET, DELAYED RELEASE ORAL
Qty: 30 | Refills: 0
Start: 2021-08-23 | End: 2021-09-21

## 2021-08-23 RX ORDER — OXYCODONE HYDROCHLORIDE 5 MG/1
1 TABLET ORAL
Qty: 30 | Refills: 0
Start: 2021-08-23 | End: 2021-08-27

## 2021-08-23 RX ORDER — PANTOPRAZOLE SODIUM 20 MG/1
40 TABLET, DELAYED RELEASE ORAL ONCE
Refills: 0 | Status: COMPLETED | OUTPATIENT
Start: 2021-08-23 | End: 2021-08-23

## 2021-08-23 RX ORDER — CLOPIDOGREL BISULFATE 75 MG/1
1 TABLET, FILM COATED ORAL
Qty: 0 | Refills: 0 | DISCHARGE

## 2021-08-23 RX ORDER — HYDROCHLOROTHIAZIDE 25 MG
25 TABLET ORAL DAILY
Refills: 0 | Status: DISCONTINUED | OUTPATIENT
Start: 2021-08-23 | End: 2021-08-23

## 2021-08-23 RX ORDER — ATORVASTATIN CALCIUM 80 MG/1
1 TABLET, FILM COATED ORAL
Qty: 0 | Refills: 0 | DISCHARGE

## 2021-08-23 RX ORDER — ATORVASTATIN CALCIUM 80 MG/1
20 TABLET, FILM COATED ORAL AT BEDTIME
Refills: 0 | Status: DISCONTINUED | OUTPATIENT
Start: 2021-08-23 | End: 2021-08-24

## 2021-08-23 RX ORDER — ENOXAPARIN SODIUM 100 MG/ML
40 INJECTION SUBCUTANEOUS
Qty: 10 | Refills: 0
Start: 2021-08-23 | End: 2021-09-01

## 2021-08-23 RX ORDER — OXYCODONE HYDROCHLORIDE 5 MG/1
5 TABLET ORAL ONCE
Refills: 0 | Status: DISCONTINUED | OUTPATIENT
Start: 2021-08-23 | End: 2021-08-23

## 2021-08-23 RX ORDER — LEVOTHYROXINE SODIUM 125 MCG
50 TABLET ORAL DAILY
Refills: 0 | Status: DISCONTINUED | OUTPATIENT
Start: 2021-08-23 | End: 2021-08-24

## 2021-08-23 RX ORDER — LEVOTHYROXINE SODIUM 125 MCG
1 TABLET ORAL
Qty: 0 | Refills: 0 | DISCHARGE

## 2021-08-23 RX ORDER — POLYETHYLENE GLYCOL 3350 17 G/17G
17 POWDER, FOR SOLUTION ORAL
Qty: 1 | Refills: 0
Start: 2021-08-23 | End: 2021-09-05

## 2021-08-23 RX ORDER — OXYCODONE HYDROCHLORIDE 5 MG/1
5 TABLET ORAL EVERY 4 HOURS
Refills: 0 | Status: DISCONTINUED | OUTPATIENT
Start: 2021-08-23 | End: 2021-08-24

## 2021-08-23 RX ORDER — POLYETHYLENE GLYCOL 3350 17 G/17G
17 POWDER, FOR SOLUTION ORAL AT BEDTIME
Refills: 0 | Status: DISCONTINUED | OUTPATIENT
Start: 2021-08-23 | End: 2021-08-24

## 2021-08-23 RX ORDER — SODIUM CHLORIDE 9 MG/ML
1000 INJECTION, SOLUTION INTRAVENOUS
Refills: 0 | Status: DISCONTINUED | OUTPATIENT
Start: 2021-08-23 | End: 2021-08-24

## 2021-08-23 RX ORDER — ACETAMINOPHEN 500 MG
2 TABLET ORAL
Qty: 84 | Refills: 0
Start: 2021-08-23 | End: 2021-09-05

## 2021-08-23 RX ORDER — HYDROMORPHONE HYDROCHLORIDE 2 MG/ML
0.5 INJECTION INTRAMUSCULAR; INTRAVENOUS; SUBCUTANEOUS EVERY 4 HOURS
Refills: 0 | Status: DISCONTINUED | OUTPATIENT
Start: 2021-08-23 | End: 2021-08-24

## 2021-08-23 RX ORDER — PANTOPRAZOLE SODIUM 20 MG/1
40 TABLET, DELAYED RELEASE ORAL DAILY
Refills: 0 | Status: DISCONTINUED | OUTPATIENT
Start: 2021-08-23 | End: 2021-08-24

## 2021-08-23 RX ORDER — LOSARTAN POTASSIUM 100 MG/1
50 TABLET, FILM COATED ORAL DAILY
Refills: 0 | Status: DISCONTINUED | OUTPATIENT
Start: 2021-08-23 | End: 2021-08-24

## 2021-08-23 RX ORDER — ONDANSETRON 8 MG/1
4 TABLET, FILM COATED ORAL EVERY 6 HOURS
Refills: 0 | Status: DISCONTINUED | OUTPATIENT
Start: 2021-08-23 | End: 2021-08-24

## 2021-08-23 RX ORDER — ENOXAPARIN SODIUM 100 MG/ML
40 INJECTION SUBCUTANEOUS DAILY
Refills: 0 | Status: DISCONTINUED | OUTPATIENT
Start: 2021-08-23 | End: 2021-08-24

## 2021-08-23 RX ORDER — LOSARTAN POTASSIUM 100 MG/1
1 TABLET, FILM COATED ORAL
Qty: 0 | Refills: 0 | DISCHARGE

## 2021-08-23 RX ORDER — ACETAMINOPHEN 500 MG
1000 TABLET ORAL EVERY 6 HOURS
Refills: 0 | Status: DISCONTINUED | OUTPATIENT
Start: 2021-08-23 | End: 2021-08-24

## 2021-08-23 RX ORDER — OXYCODONE HYDROCHLORIDE 5 MG/1
10 TABLET ORAL EVERY 4 HOURS
Refills: 0 | Status: DISCONTINUED | OUTPATIENT
Start: 2021-08-23 | End: 2021-08-24

## 2021-08-23 RX ORDER — SENNA PLUS 8.6 MG/1
2 TABLET ORAL AT BEDTIME
Refills: 0 | Status: DISCONTINUED | OUTPATIENT
Start: 2021-08-23 | End: 2021-08-24

## 2021-08-23 RX ORDER — SENNA PLUS 8.6 MG/1
2 TABLET ORAL
Qty: 28 | Refills: 0
Start: 2021-08-23 | End: 2021-09-05

## 2021-08-23 RX ORDER — MELOXICAM 15 MG/1
1 TABLET ORAL
Qty: 0 | Refills: 0 | DISCHARGE

## 2021-08-23 RX ORDER — CEFAZOLIN SODIUM 1 G
2000 VIAL (EA) INJECTION EVERY 8 HOURS
Refills: 0 | Status: COMPLETED | OUTPATIENT
Start: 2021-08-23 | End: 2021-08-24

## 2021-08-23 RX ADMIN — FENTANYL CITRATE 50 MICROGRAM(S): 50 INJECTION INTRAVENOUS at 12:15

## 2021-08-23 RX ADMIN — PANTOPRAZOLE SODIUM 40 MILLIGRAM(S): 20 TABLET, DELAYED RELEASE ORAL at 08:56

## 2021-08-23 RX ADMIN — OXYCODONE HYDROCHLORIDE 5 MILLIGRAM(S): 5 TABLET ORAL at 12:35

## 2021-08-23 RX ADMIN — ENOXAPARIN SODIUM 40 MILLIGRAM(S): 100 INJECTION SUBCUTANEOUS at 22:40

## 2021-08-23 RX ADMIN — FENTANYL CITRATE 50 MICROGRAM(S): 50 INJECTION INTRAVENOUS at 12:35

## 2021-08-23 RX ADMIN — ATORVASTATIN CALCIUM 20 MILLIGRAM(S): 80 TABLET, FILM COATED ORAL at 22:39

## 2021-08-23 RX ADMIN — Medication 100 MILLIGRAM(S): at 17:00

## 2021-08-23 RX ADMIN — CLOPIDOGREL BISULFATE 75 MILLIGRAM(S): 75 TABLET, FILM COATED ORAL at 22:39

## 2021-08-23 RX ADMIN — Medication 1000 MILLIGRAM(S): at 17:41

## 2021-08-23 RX ADMIN — FENTANYL CITRATE 50 MICROGRAM(S): 50 INJECTION INTRAVENOUS at 12:31

## 2021-08-23 RX ADMIN — OXYCODONE HYDROCHLORIDE 5 MILLIGRAM(S): 5 TABLET ORAL at 12:31

## 2021-08-23 RX ADMIN — FENTANYL CITRATE 50 MICROGRAM(S): 50 INJECTION INTRAVENOUS at 12:30

## 2021-08-23 RX ADMIN — Medication 1000 MILLIGRAM(S): at 17:42

## 2021-08-23 RX ADMIN — SODIUM CHLORIDE 75 MILLILITER(S): 9 INJECTION, SOLUTION INTRAVENOUS at 12:35

## 2021-08-23 RX ADMIN — SODIUM CHLORIDE 75 MILLILITER(S): 9 INJECTION, SOLUTION INTRAVENOUS at 16:59

## 2021-08-23 NOTE — DISCHARGE NOTE PROVIDER - NSDCFUADDINST_GEN_ALL_CORE_FT
PRESCRIPTIONS:   Pain- An eRx will be sent elecronically to your pharmacy for  before DC or the day of.  Oxycodone is prescrived for severe pain. Wean off the Oxycodone as soon as your can. Additionally you can take Extra Strength Tylenol with the Oxycodone. Also Mortin/ibuprofen is ok (unless you have kidney disease or GI bleeding in past).    Blood Clot Prevention- Aspirin EC 325mg take once a day for two weeks.    Stool Softener- We also recommend Miralax  stool softener which is over the counter to take daily while you are on Percocet.    If you need any refills on your medications, please call Dr. Marks’s office when you have a 3-day supply left. Some of the medications (narcotics) cannot be called into a pharmacy and the prescription will need to be picked up at the office or mailed to you. If you were taking other medications for blood pressure, heart problems etc., you should discuss this with your family physician.     BANDAGE/INCISION CARE : A two layer water-resistant dressing is applied during surgery. You can remove the outer layer 3 days after surgery (Post-op Day 3) or sooner if it gets wet. The inner layer will stay on until your first post-operative appointment. The inner dressing is waterproof so you may shower immediately but avoid soaking in bathtub or swimming pool.     Once your dressing is removed, you may leave your incision open to air or apply a dry gauze dressing. The sutures are dissolvable and Dermabond (surgical glue) is applied. Skin glue will gradually come off as incision heals. If you notice redness, swelling or drainage around incision, contact Dr. Marks’s office immediately.      SLING: For the first two to six weeks after your surgery, you will be wearing your sling the majority of time, coming out of it to complete exercises given to you by the therapists and Dr. Marks upon your discharge from the hospital. After your first post-operative visit, Dr. Marks may suggest you come out of your sling during the day to do activities in front of you.  Wearing the sling alerts others around you to be cautious and to avoid accidentally striking your arm. Also, during this time do not use your arm to pull or push yourself out of bed or out of a chair.     ACTIVITY: NO shoulder ROM. Bend and straighten elbow a few times daily so it doesnt get stiff. Walk Plenty. No sitting around. NO lifting with operative arm. See Detailed instructions in your packet.    ICE: Ice plenty and often during the first 2 weeks. Protect skin from direct ice using a towel or pillow case barrier.    PHYSICAL THERAPY: Dr. Marks prefers that his patients do NOT do formal Physical Therapy after Total Shoulder Replacement. He has found that most patients do well with progressing their motion and strength through normal daily activities. He has also found that sometimes physical therapy pushes patients too much causing increased pain and discomfort. At each post-operative appointment, Dr. Marks will teach you exercises to do own to work on range of motion. Although you should progress with some gentle range of motion, as demonstrated by Dr. Marks, do not force any shoulder motions. Most importantly, do not let anyone (family members, physical therapists, etc) force your arm into uncomfortable positions.      DRIVING: You are NOT permitted to drive a car while taking narcotic medication or when you are wearing a sling. Do not drive until after your first follow-up visit with your surgeon.     RETURNING TO WORK: Returning to work depends on the demands of your job and should be discussed with Dr. Marks before the surgery.     FOLLOW UP: Dr. Marks or RAPHAEL Ryan will need to see you for multiple appointments after your surgery. Typically, Dr. Marks or Shelby will see you back after 2 weeks, 6 weeks, 3 months and 6 months. You will be seen at 1 year, 2 years, 5 years and 10 year intervals thereafter.     **Please refer to patient post op info packet given to you at office visit for furter details. PRESCRIPTIONS:   Pain- An eRx will be sent electronically to your pharmacy for  before DC or the day of.  Oxycodone is prescribed for severe pain. Wean off the Oxycodone as soon as your can. Additionally you can take Extra Strength Tylenol with the Oxycodone. Also Mortin/ibuprofen is ok (unless you have kidney disease or GI bleeding in past).    Blood Clot Prevention- As per Anticoagulation team. See med rec.    Stool Softener- We also recommend Miralax  stool softener which is over the counter to take daily while you are on Percocet.    If you need any refills on your medications, please call Dr. Marks’s office when you have a 3-day supply left. Some of the medications (narcotics) cannot be called into a pharmacy and the prescription will need to be picked up at the office or mailed to you. If you were taking other medications for blood pressure, heart problems etc., you should discuss this with your family physician.     BANDAGE/INCISION CARE : A two layer water-resistant dressing is applied during surgery. You can remove the outer layer 3 days after surgery (Post-op Day 3) or sooner if it gets wet. The inner layer will stay on until your first post-operative appointment. The inner dressing is waterproof so you may shower immediately but avoid soaking in bathtub or swimming pool.     Once your dressing is removed, you may leave your incision open to air or apply a dry gauze dressing. The sutures are dissolvable and Dermabond (surgical glue) is applied. Skin glue will gradually come off as incision heals. If you notice redness, swelling or drainage around incision, contact Dr. Marks’s office immediately.      SLING: For the first two to six weeks after your surgery, you will be wearing your sling the majority of time, coming out of it to complete exercises given to you by the therapists and Dr. Marks upon your discharge from the hospital. After your first post-operative visit, Dr. Marks may suggest you come out of your sling during the day to do activities in front of you.  Wearing the sling alerts others around you to be cautious and to avoid accidentally striking your arm. Also, during this time do not use your arm to pull or push yourself out of bed or out of a chair.     ACTIVITY: NO shoulder ROM. Bend and straighten elbow a few times daily so it doesnt get stiff. Walk Plenty. No sitting around. NO lifting with operative arm. See Detailed instructions in your packet.    ICE: Ice plenty and often during the first 2 weeks. Protect skin from direct ice using a towel or pillow case barrier.    PHYSICAL THERAPY: Dr. Marks prefers that his patients do NOT do formal Physical Therapy after Total Shoulder Replacement. He has found that most patients do well with progressing their motion and strength through normal daily activities. He has also found that sometimes physical therapy pushes patients too much causing increased pain and discomfort. At each post-operative appointment, Dr. Marks will teach you exercises to do own to work on range of motion. Although you should progress with some gentle range of motion, as demonstrated by Dr. Marks, do not force any shoulder motions. Most importantly, do not let anyone (family members, physical therapists, etc) force your arm into uncomfortable positions.      DRIVING: You are NOT permitted to drive a car while taking narcotic medication or when you are wearing a sling. Do not drive until after your first follow-up visit with your surgeon.     RETURNING TO WORK: Returning to work depends on the demands of your job and should be discussed with Dr. Marks before the surgery.     FOLLOW UP: Dr. Marks or RAPHAEL Ryan will need to see you for multiple appointments after your surgery. Typically, Dr. Marks or Shelby will see you back after 2 weeks, 6 weeks, 3 months and 6 months. You will be seen at 1 year, 2 years, 5 years and 10 year intervals thereafter.     **Please refer to patient post op info packet given to you at office visit for further details.

## 2021-08-23 NOTE — DISCHARGE NOTE PROVIDER - HOSPITAL COURSE
H&P:  Pt is a 75y Female    PAST MEDICAL & SURGICAL HISTORY:  Hypertension    Subclavian artery stenosis    Hypothyroid    Dyslipidemia    Fibromyalgia    GERD (gastroesophageal reflux disease)    Arthritis    PVD (peripheral vascular disease)    Carotid stenosis    Claudication    Smoker    Herniated cervical disc  ROM intact    COVID-19 vaccine series completed  Moderna 2021    S/P angiogram of extremity  2020 stent bilateral lower extremity    History of cholecystectomy      H/O:       H/O colonoscopy         Now s/p Reverse Right Total Shoulder Arthroplasty. Pt is afebrile with stable vital signs. Pain is controlled. Alert and Oriented. Exam reveals intact AIN/PIN, wrist flexion and wrist extension, 2+Radial Pulse. Dressing is clean and dry.    Hospital Course:  Patient presented to [facility] medically cleared for Reverse Right Total Shoulder Replacement Surgery, having failed outpatient non-operative conservative management. Prophylactic antibiotics were started before the procedure and continued for 24 hours. They were admitted after surgery to the orthopedic floor.  There were no complications during the hospital stay.     Routine consult was obtained from Physical Therapy for gait training, NO shoulder ROM. Consult to Hospitalist for Medical Co-management. Patient was placed on anticoagulation post-op.  Pertinent home medications were continued.  Daily labs were followed.      On POD 0 there were no overnight events and the pt was OOB with PT. POD 1, PT was continued, and anticipate POD 1 DC to home. The orthopedic Attending is aware and agrees.

## 2021-08-23 NOTE — DISCHARGE NOTE PROVIDER - CARE PROVIDER_API CALL
Venu Marks (DO)  [address], [address]  [place] [address]  Phone: [phone]  Fax: [phone]  Follow Up Time:

## 2021-08-23 NOTE — ASU PATIENT PROFILE, ADULT - NS PRO LAST MENSTRUAL
Chronic asymptomatic fair control patient on statin and she is on aspirin discussed the patient important lose weight important control risk factor including diabetic obesity hyperlipidemia and high blood pressure unknown

## 2021-08-23 NOTE — ASU PATIENT PROFILE, ADULT - REASON FOR ADMISSION, PROFILE
April 8, 2021     Patient: Francis Peoples   YOB: 1974   Date of Visit: 4/8/2021       To Whom it May Concern:    Joristeve Winstonvargas is under my professional care  She was seen in my office on 4/8/2021  Please excuse her from work on Thursday 04/08/2021  If you have any questions or concerns, please don't hesitate to call           Sincerely,          JOSÉ MIGUEL Wyman        CC: No Recipients right shoulder total replacement

## 2021-08-23 NOTE — BRIEF OPERATIVE NOTE - OPERATION/FINDINGS
Was the patient seen in the last year in this department? No     Does patient have an active prescription for medications requested? No     Received Request Via: Pharmacy   right irreparable rct

## 2021-08-23 NOTE — CONSULT NOTE ADULT - ATTENDING COMMENTS
Pt seen and examined on 2N. D/w RAPHAEL Fleming. Agree with documentation as above with changes made where appropriate.   PT admitted for right shoulder arthroplasty.   Doing well. C/o mild pain right shoulder. no sob/chest pain.   Continue pain meds prn  physical therapy consult  incentive spirometry  bowel regimen.   continue home meds as above.     thank you, will follow.

## 2021-08-23 NOTE — ASU PATIENT PROFILE, ADULT - FALLEN IN THE PAST
no Topical Clindamycin Counseling: Patient counseled that this medication may cause skin irritation or allergic reactions.  In the event of skin irritation, the patient was advised to reduce the amount of the drug applied or use it less frequently.   The patient verbalized understanding of the proper use and possible adverse effects of clindamycin.  All of the patient's questions and concerns were addressed.

## 2021-08-23 NOTE — CONSULT NOTE ADULT - SUBJECTIVE AND OBJECTIVE BOX
HPI:  Patient is a 75y old  Female who presents with a chief complaint of R Shoulder replacement (23 Aug 2021 09:05)    Consulted by Dr. Marks  for VTE prophylaxis, risk stratification, and anticoagulation management.    PAST MEDICAL & SURGICAL HISTORY:  Hypertension    Subclavian artery stenosis    Hypothyroid    Dyslipidemia    Fibromyalgia    GERD (gastroesophageal reflux disease)    Arthritis    PVD (peripheral vascular disease)    Carotid stenosis    Claudication    Smoker    Herniated cervical disc  ROM intact    COVID-19 vaccine series completed  Moderna 2021    S/P angiogram of extremity   stent bilateral lower extremity    History of cholecystectomy      H/O:       H/O colonoscopy    Interval History:  21: Patient seen at bedside in PACU. She speaks Amharic, but responding appropriately to ROS in English. She is still reporting pain in RUE despite block. She admits she is allergic to ASA. Advised that she is higher risk due to PVD and now with some impaired mobility- will recommend Lovenox. Will educate further once settled on floor and more alert with  phone.    BMI:33.1    CrCl: 89.4    Incision Time:0955  Procedure End Time:1152  EBL:100ml    Caprini VTE Risk Score:  CAPRINI SCORE  AGE RELATED RISK FACTORS                                                       MOBILITY RELATED FACTORS  [ ] Age 41-60 years                                            (1 Point)                  [ ] Bed rest /restricted mobility                      (1 Point)  [x ] Age: 61-74 years                                           (2 Points)                [ ] Plaster cast                                                   (2 Points)  [ ] Age= 75 years                                              (3 Points)                 [ ] Bed bound for more than 72 hours                   (2 Points)    DISEASE RELATED RISK FACTORS                                               GENDER SPECIFIC FACTORS  [ ] Edema in the lower extremities                       (1 Point)           [ ] Pregnancy                                                            (1 Point)  [ ] Varicose veins                                               (1 Point)                  [ ] Post-partum < 6 weeks                                      (1 Point)             [x ] BMI > 25 Kg/m2                                            (1 Point)                  [ ] Hormonal therapy or oral contraception       (1 Point)                 [ ] Sepsis (in the previous month)                        (1 Point)             [ ] History of pregnancy complications                (1Point)  [ ] Pneumonia or serious lung disease                                             [ ] Unexplained or recurrent  (=/>3), premature                                 (In the previous month)                               (1 Point)                birth with toxemia or growth-restricted infant (1 Point)  [ ] Abnormal pulmonary function test            (1 Point)                                   SURGERY RELATED RISK FACTORS  [ ] Acute myocardial infarction                       (1 Point)                  [ ]  Section                                         (1 Point)  [ ] Congestive heart failure (in the previous month) (1 Point)   [ ] Minor surgery   lasting <45 minutes       (1 Point)   [ ] Inflammatory bowel disease                             (1 Point)          [ ] Arthroscopic surgery                                  (2 Points)  [ ] Central venous access                                    (2 Points)            [x ] General surgery lasting >45 minutes      (2 Points)       [ ] Stroke (in the previous month)                  (5 Points)            [ ] Elective major lower extremity arthroplasty (5 Points)                                   [  ] Malignancy (present or past include skin melanoma                                          but exclude  basal skin cell)    (2 points)                                      TRAUMA RELATED RISK FACTORS                HEMATOLOGY RELATED FACTORS                                  [ ] Fracture of the hip, pelvis, or leg                       (5 Points)  [ ] Prior episodes of VTE                                     (3 Points)          [ ] Acute spinal cord injury (in the previous month)  (5 Points)  [ ] Positive family history for VTE                         (3 Points)       [ ] Paralysis (less than 1 month)                          (5 Points)  [ ] Prothrombin 34753 A                                      (3 Points)         [ ] Multiple Trauma (within 1month)                 (5Points)                                                                                                                                                                [ ] Factor V Leiden                                          (3 Points)                                OTHER RISK FACTORS                          [ ] Lupus anticoagulants                                     (3 Points)                     [ ] BMI > 40                          (1 Point)                                                         [ ] Anticardiolipin antibodies                                (3 Points)                 [x ] Smoking                              (1Point)                                                [ ] High homocysteine in the blood                      (3 Points)                [  ] Diabetes requiring insulin (1point)                         [ ] Other congenital or acquired thrombophilia       (3 Points)          [  ] Chemotherapy                   (1 Point)  [ ] Heparin induced thrombocytopenia                  (3 Points)             [  ] Blood Transfusion                (1 point)                                                                                                             Total Score [     6     ]                                                                                                                                                                                                                                                                                                                                                                                                                                           IMPROVE Bleeding Risk Score:1.5      Falls Risk:   High (  )  Mod (  )  Low (  )      FAMILY HISTORY:  No pertinent family history in first degree relatives      Denies any personal or familial history of clotting or bleeding disorders.    Allergies    adhesives (Rash)  aspirin (Anaphylaxis)  enalapril (Other (Mild))    Intolerances        REVIEW OF SYSTEMS    (  )Fever	        (  )Constipation	(  )SOB				  (  )Headache   (  )Dysuria  (  )Chills	        (  )Melena	        (  )Dyspnea on exertion (  )Dizziness    (  )Polyuria  (  )Nausea      (  )Hematochezia	(  )Cough                          (  )Syncope      (  )Hematuria  (  )Vomiting   (  )Chest Pain	        (  )Wheezing			  (  )Weakness  (  )Diarrhea    (  )Palpitations	(  )Anorexia			  ( x )Myalgia       (  x ) Arthralgia    Pertinent positives in HPI and daily subjective. All other systems negative.    Vital Signs Last 24 Hrs  T(C): 36.1 (08-23-21 @ 11:45), Max: 36.1 (21 @ 08:21)  T(F): 97 (21 @ 11:45), Max: 97 (21 @ 08:21)  HR: 67 (21 @ 12:45) (64 - 71)  BP: 118/53 (21 @ 12:45) (118/53 - 146/53)  BP(mean): --  RR: 15 (21 @ 12:45) (13 - 16)  SpO2: 97% (21 @ 12:45) (97% - 100%)      PHYSICAL EXAM:    Constitutional: Appears uncomfrotable    Neurological: A& O x 3    Skin: Warm    Respiratory and Thorax: normal effort; Breath sounds: normal; No rales/wheezing/rhonchi  	  Cardiovascular: S1, S2, regular, NMBR MP: RSR 74 bpm	    Gastrointestinal: BS + x 4Q, nontender	    Genitourinary:  Bladder nondistended, nontender    Musculoskeletal:   General Right:   no muscle/joint tenderness,   normal tone, no joint swelling,   ROM: limited	    General Left:   no muscle/joint tenderness,   normal tone, no joint swelling,   ROM: full    Shoulder:  Rt: Drsing CDI; Sling/immob. noted; Cap refill good; Radial pulse +; Sensation intact                     Lower extrems:   Right: no calf tenderness              negative porfirio's sign               + pedal pulses    Left:   no calf tenderness              negative porfirio's sign               + pedal pulses                          13.3   9.90  )-----------( 146      ( 23 Aug 2021 12:00 )             41.0           140  |  109<H>  |  19  ----------------------------<  164<H>  3.6   |  27  |  0.54    Ca    8.7      23 Aug 2021 12:00        				    MEDICATIONS  (STANDING):  atorvastatin 20 milliGRAM(s) Oral at bedtime  clopidogrel Tablet 75 milliGRAM(s) Oral daily  enoxaparin Injectable 40 milliGRAM(s) SubCutaneous daily  hydrochlorothiazide 25 milliGRAM(s) Oral daily  lactated ringers. 1000 milliLiter(s) IV Continuous <Continuous>  levothyroxine 50 MICROGram(s) Oral daily  losartan 50 milliGRAM(s) Oral daily      **Current DVT Prophylaxis:    LMWH                   ( x )  Heparin SQ           (  )  Coumadin             (  )  Xarelto                  (  )  Eliquis                   (  )  Venodynes           ( x )  Ambulation          (x  )  UFH                       (  )  ECASA                   (  )  Contraindicated  (  )

## 2021-08-23 NOTE — CHART NOTE - NSCHARTNOTEFT_GEN_A_CORE
Spoke with orthopedic PA- Dr. Marks would not like patient sent home on Lovenox- prefers her Plavix only as patient on long term PVD. May remain on Lovenox while in-house. Will sign off, please do not hesitate to reconsult if necessary.

## 2021-08-23 NOTE — CONSULT NOTE ADULT - ASSESSMENT
This is a 75 year old female s/p right reverse total shoulder replacement 8/23 with moderate risk for VTE due to surgery, PVD, smoker, and impaired mobility. She has allergy to aspirin therefore recommend Lovenox 40mg SQ daily x 7-10 days pending mobility. Patient is on Plavix for PVD and s/p 2020 bilateral peripheral stents. Will resume post-op.    Plan:  ::Lovenox 40 mg SQ daily x 7-10 days (script sent to pharmacy)  ::Daily CBC/BMP  ::Venodynes b/l  ::Amb per PT initial eval    Thank you for this consult, will continue to follow.  Dispo: Home

## 2021-08-23 NOTE — DISCHARGE NOTE PROVIDER - NSDCCPTREATMENT_GEN_ALL_CORE_FT
PRINCIPAL PROCEDURE  Procedure: Open reverse total shoulder replacement  Findings and Treatment: Right Reverse Total Shoulder Arthroplasty

## 2021-08-23 NOTE — PROGRESS NOTE ADULT - SUBJECTIVE AND OBJECTIVE BOX
Orthopedics     POD 0 R rTSA  Pt seen and examined at the bedside. Pain is well controlled at this time. Pt reports feeling well, no concerns at this time.     Vital Signs Last 24 Hrs  T(C): 36.3 (08-23-21 @ 15:15), Max: 36.3 (08-23-21 @ 14:30)  T(F): 97.4 (08-23-21 @ 15:15), Max: 97.4 (08-23-21 @ 15:15)  HR: 68 (08-23-21 @ 15:15) (64 - 72)  BP: 127/46 (08-23-21 @ 15:15) (103/79 - 146/53)  BP(mean): --  RR: 16 (08-23-21 @ 15:15) (12 - 16)  SpO2: 99% (08-23-21 @ 15:15) (97% - 100%)                        13.3   9.90  )-----------( 146      ( 23 Aug 2021 12:00 )             41.0     23 Aug 2021 12:00    140    |  109    |  19     ----------------------------<  164    3.6     |  27     |  0.54     Ca    8.7        23 Aug 2021 12:00      Exam:  GEN: NAD, awake and alert.  RIGHT Upper Extremity:   Dressing clean dry and intact  Reporting descreased sensation over dorsum of hand.  Motor grossly intact throughout axillary/musculocutaneous/radial/median/ulnar/AIN/PIN nerves  + radial pulse  Compartments soft and compressible      A/P:  75yF s/p R total shoulder arthroplasty POD #0  -Pain control prn  - DVT ppx: Only plavix to go home. Lovenox okay while admitted  - NWB RUE in shoulder immobilizer/PT/OOB as tolerated   - FU am labs  - Med mgmt, continue home meds.  -FU repeat neuro exam in am  -Likely D/c 8/24.

## 2021-08-23 NOTE — DISCHARGE NOTE PROVIDER - NSDCMRMEDTOKEN_GEN_ALL_CORE_FT
atorvastatin 20 mg oral tablet: 1 tab(s) orally once a day  clopidogrel 75 mg oral tablet: 1 tab(s) orally once a day-cardio for management   hydroCHLOROthiazide 25 mg oral tablet: 1 tab(s) orally once a day  levothyroxine 50 mcg (0.05 mg) oral tablet: 1 tab(s) orally once a day  losartan 50 mg oral tablet: 1 tab(s) orally once a day  meloxicam 15 mg oral tablet: 1 tab(s) orally once a day   atorvastatin 20 mg oral tablet: 1 tab(s) orally once a day  clopidogrel 75 mg oral tablet: 1 tab(s) orally once a day-cardio for management   hydroCHLOROthiazide 25 mg oral tablet: 1 tab(s) orally once a day  levothyroxine 50 mcg (0.05 mg) oral tablet: 1 tab(s) orally once a day  losartan 50 mg oral tablet: 1 tab(s) orally once a day  MiraLax oral powder for reconstitution: 17 gram(s) orally once a day  as needed for constipation  oxyCODONE 5 mg oral tablet: 1 tab(s) orally every 4 hours as needed for severe pain; MDD:6  pantoprazole 40 mg oral delayed release tablet: 1 tab(s) orally once a day   senna oral tablet: 2 tab(s) orally once a day (at bedtime)   Tylenol Extra Strength 500 mg oral tablet: 2 tab(s) orally 3 times a day

## 2021-08-23 NOTE — CONSULT NOTE ADULT - SUBJECTIVE AND OBJECTIVE BOX
PCP: [facility] [place]    CHIEF COMPLAINT:  inc right shoulder pain    HISTORY OF THE PRESENT ILLNESS: this is a 74yo female with PMH of subclavian artery and carotid stenosis, HLD, HTN, PVD s/p bilateral iliac stents, hypothyroid, fibromyalgia, GERD, OA of the right shoulder with progressive pain with ambulation, climbing stairs and inc pain at night, who failed the usual modalities for shoulder pain and is now s/p right reverse total shoulder replacement.  Pt is seen in PACU, in NAD, c/o right shoulder pain. Denies HA, lightheadedness, dizziness, chest pain, SOB, nausea, abd pain, dysuria, difficulty urinating.  We are consulted for medical management.    PAST MEDICAL HISTORY: as above    PAST SURGICAL HISTORY:   - colonoscopy  -    - cholecystectomy   - s/p angiogram with b/l iliac stent placement     FAMILY HISTORY: as per cardiology preop note  Father- HTN, colon ca,   Mother- DM, breast ca  Sister-  56yo, MI    SOCIAL HISTORY: current smoker, 1/2 PPD x 30-40yrs, no alcohol, no drugs    ALLERGIES: Aspirin, enalapril, adhesives    HOME MEDS: see med rec    REVIEW OF SYSTEMS: All 10 systems reviewed in detailed and found to be negative with the exception of what has already been described above    MEDICATIONS  (STANDING):  atorvastatin 20 milliGRAM(s) Oral at bedtime  clopidogrel Tablet 75 milliGRAM(s) Oral daily  enoxaparin Injectable 40 milliGRAM(s) SubCutaneous daily  hydrochlorothiazide 25 milliGRAM(s) Oral daily  lactated ringers. 1000 milliLiter(s) (75 mL/Hr) IV Continuous <Continuous>  levothyroxine 50 MICROGram(s) Oral daily  losartan 50 milliGRAM(s) Oral daily    MEDICATIONS  (PRN):  fentaNYL    Injectable 50 MICROGram(s) IV Push every 10 minutes PRN Severe Pain (7 - 10)  ondansetron Injectable 4 milliGRAM(s) IV Push every 6 hours PRN Nausea and/or Vomiting    VITALS:  T(F): 97 (21 @ 11:45), Max: 97 (21 @ 08:21)  HR: 67 (21 @ 13:30) (64 - 71)  BP: 134/56 (21 @ 13:30) (118/53 - 146/53)  RR: 15 (21 @ 13:30) (13 - 16)  SpO2: 97% (21 @ 13:30) (97% - 100%)  Wt(kg): --    I&O's Summary  23 Aug 2021 07:01  -  23 Aug 2021 14:41  --------------------------------------------------------  IN: 800 mL / OUT: 0 mL / NET: 800 mL    CAPILLARY BLOOD GLUCOSE  POCT Blood Glucose.: 174 mg/dL (23 Aug 2021 11:46)  POCT Blood Glucose.: 106 mg/dL (23 Aug 2021 08:25)    PHYSICAL EXAM:    HEENT:  pupils equal and reactive, EOMI  NECK:   supple, no palpable lymph nodes  CV:  +S1, +S2, regular, + RUSB systolic blowing murmur  RESP:  lungs clear to auscultation bilaterally, poor air movement, no wheezing, rales, rhonchi  BREAST: not examined  GI: abdomen soft, non-tender, non-distended, normal BS  RECTAL: not examined  : not examined  MSK: normal muscle tone, no atrophy  EXT: no cyanosis, no edema, no calf pain, swelling or erythema  VASCULAR: pulses equal and symmetric in the upper and lower extremities  NEURO: AAOX3, no focal neurological deficits, follows all commands, able to move extremities spontaneously  SKIN:  no ulcers, lesions or rashes    LABS:                        13.3   9.90  )-----------( 146      ( 23 Aug 2021 12:00 )             41.0         140  |  109<H>  |  19  ----------------------------<  164<H>  3.6   |  27  |  0.54    Ca    8.7      23 Aug 2021 12:00    IMPRESSION: 74yo female with above pmh a/w    #inc pain in right shoulder  #S/P right reverse total shoulder replacement  - POD#0  - pain control  - PT eval  - Bowel regimen  - IVF's  - Finish ABXs  - regular diet  - PPI  - monitor CBC/BMP    #subclavian artery and carotid stenosis  #PVD s/p bilateral iliac stents  - cont plavix 75mg qd when okay with surgery team    #current smoker  - 1/2 PPD x 30-40yrs  - offered nicotine patch, pt declined. reports that it makes her anxious    #HTN  - cont home meds: losartan 50mg qd ,HCTZ 25mg qd    #HLD  - cont atorvastatin 20mg qd    #hypothyroid  - cont levothyroxine 50mcg    #GERD  - on PPI    #fibromyalgia  - stable    # VTE prophylaxis  - AC consult  - Venodynes  - inc mobility      Thank you for the consult, will follow       PCP: [facility] [place]    CHIEF COMPLAINT: OA    HISTORY OF THE PRESENT ILLNESS: this is a 74 yo female with PMH of subclavian artery and carotid stenosis, HLD, HTN, PVD s/p bilateral iliac stents, hypothyroid, fibromyalgia, GERD, OA of the right shoulder with progressive pain with ambulation, climbing stairs and inc pain at night, who failed the usual modalities for shoulder pain and is now s/p right reverse total shoulder replacement.  Pt is seen in PACU, in NAD, c/o right shoulder pain. Denies HA, lightheadedness, dizziness, chest pain, SOB, nausea, abd pain, dysuria, difficulty urinating.  We are consulted for medical management.    REVIEW OF SYSTEMS: All 10 systems reviewed in detailed and found to be negative with the exception of what has already been described above    PAST MEDICAL HISTORY: as above    PAST SURGICAL HISTORY:   - colonoscopy  -    - cholecystectomy   - s/p angiogram with b/l iliac stent placement     FAMILY HISTORY: as per cardiology preop note  Father- HTN, colon ca,   Mother- DM, breast ca  Sister-  58yo, MI    SOCIAL HISTORY: current smoker, 1/2 PPD x 30-40yrs, no alcohol, no drugs    ALLERGIES: Aspirin, enalapril, adhesives    HOME MEDS: see med rec      VITALS:  T(F): 97 (21 @ 11:45), Max: 97 (21 @ 08:21)  HR: 67 (21 @ 13:30) (64 - 71)  BP: 134/56 (21 @ 13:30) (118/53 - 146/53)  RR: 15 (21 @ 13:30) (13 - 16)  SpO2: 97% (21 @ 13:30) (97% - 100%)      PHYSICAL EXAM:    HEENT:  pupils equal and reactive, EOMI  NECK:   supple, no palpable lymph nodes  CV:  +S1, +S2, regular, + RUSB systolic blowing murmur  RESP:  lungs clear to auscultation bilaterally, poor air movement, no wheezing, rales, rhonchi  BREAST: not examined  GI: abdomen soft, non-tender, non-distended, normal BS  RECTAL: not examined  : not examined  MSK: normal muscle tone, no atrophy  EXT: no cyanosis, no edema, no calf pain, swelling or erythema  VASCULAR: pulses equal and symmetric in the upper and lower extremities  NEURO: AAOX3, no focal neurological deficits, follows all commands, able to move extremities spontaneously  SKIN:  no ulcers, lesions or rashes    LABS:                        13.3   9.90  )-----------( 146      ( 23 Aug 2021 12:00 )             41.0     08-23    140  |  109<H>  |  19  ----------------------------<  164<H>  3.6   |  27  |  0.54    Ca    8.7      23 Aug 2021 12:00    MEDICATIONS  (STANDING):  acetaminophen   Tablet .. 1000 milliGRAM(s) Oral every 6 hours  atorvastatin 20 milliGRAM(s) Oral at bedtime  ceFAZolin   IVPB 2000 milliGRAM(s) IV Intermittent every 8 hours  clopidogrel Tablet 75 milliGRAM(s) Oral daily  enoxaparin Injectable 40 milliGRAM(s) SubCutaneous daily  hydrochlorothiazide 25 milliGRAM(s) Oral daily  lactated ringers. 1000 milliLiter(s) (75 mL/Hr) IV Continuous <Continuous>  levothyroxine 50 MICROGram(s) Oral daily  losartan 50 milliGRAM(s) Oral daily  pantoprazole    Tablet 40 milliGRAM(s) Oral daily  polyethylene glycol 3350 17 Gram(s) Oral at bedtime  senna 2 Tablet(s) Oral at bedtime    MEDICATIONS  (PRN):  HYDROmorphone  Injectable 0.5 milliGRAM(s) SubCutaneous every 4 hours PRN Severe Pain (7 - 10)  ondansetron Injectable 4 milliGRAM(s) IV Push every 6 hours PRN Nausea and/or Vomiting  oxyCODONE    IR 5 milliGRAM(s) Oral every 4 hours PRN Mild Pain (1 - 3)  oxyCODONE    IR 10 milliGRAM(s) Oral every 4 hours PRN Moderate Pain (4 - 6)  prochlorperazine   Injectable 10 milliGRAM(s) IV Push every 8 hours PRN Nausea and/or Vomiting      IMPRESSION: 76yo female with above pmh a/w    #OA right shoulder  #S/P right reverse total shoulder replacement  - POD#0  - pain control  - PT eval  - Bowel regimen  - IVF's  - Finish ABXs  - regular diet  - PPI  - monitor CBC/BMP    #subclavian artery and carotid stenosis  #PVD s/p bilateral iliac stents  - cont plavix 75mg daily    #current smoker  - 1/2 PPD x 30-40yrs  - offered nicotine patch, pt declined. reports that it makes her anxious    #HTN  - cont home meds: losartan 50mg qd   -hold hctz.     #HLD  - cont atorvastatin 20mg qd    #hypothyroid  - cont levothyroxine 50mcg    #GERD  - on PPI    #fibromyalgia  - stable    # VTE prophylaxis  - AC consult  - Venodynes  - inc mobility      Thank you for the consult, will follow

## 2021-08-23 NOTE — DISCHARGE NOTE PROVIDER - NSDCACTIVITY_GEN_ALL_CORE
Walking - Indoors allowed/Walking - Outdoors allowed Stairs allowed/Walking - Indoors allowed/No heavy lifting/straining/Walking - Outdoors allowed

## 2021-08-24 ENCOUNTER — TRANSCRIPTION ENCOUNTER (OUTPATIENT)
Age: 76
End: 2021-08-24

## 2021-08-24 VITALS
TEMPERATURE: 99 F | HEART RATE: 60 BPM | RESPIRATION RATE: 16 BRPM | SYSTOLIC BLOOD PRESSURE: 167 MMHG | OXYGEN SATURATION: 97 % | DIASTOLIC BLOOD PRESSURE: 62 MMHG

## 2021-08-24 LAB
ANION GAP SERPL CALC-SCNC: 3 MMOL/L — LOW (ref 5–17)
BUN SERPL-MCNC: 25 MG/DL — HIGH (ref 7–23)
CALCIUM SERPL-MCNC: 8.6 MG/DL — SIGNIFICANT CHANGE UP (ref 8.5–10.1)
CHLORIDE SERPL-SCNC: 108 MMOL/L — SIGNIFICANT CHANGE UP (ref 96–108)
CO2 SERPL-SCNC: 30 MMOL/L — SIGNIFICANT CHANGE UP (ref 22–31)
CREAT SERPL-MCNC: 0.54 MG/DL — SIGNIFICANT CHANGE UP (ref 0.5–1.3)
GLUCOSE SERPL-MCNC: 117 MG/DL — HIGH (ref 70–99)
HCT VFR BLD CALC: 37 % — SIGNIFICANT CHANGE UP (ref 34.5–45)
HGB BLD-MCNC: 11.9 G/DL — SIGNIFICANT CHANGE UP (ref 11.5–15.5)
MCHC RBC-ENTMCNC: 30 PG — SIGNIFICANT CHANGE UP (ref 27–34)
MCHC RBC-ENTMCNC: 32.2 GM/DL — SIGNIFICANT CHANGE UP (ref 32–36)
MCV RBC AUTO: 93.2 FL — SIGNIFICANT CHANGE UP (ref 80–100)
PLATELET # BLD AUTO: 129 K/UL — LOW (ref 150–400)
POTASSIUM SERPL-MCNC: 3.5 MMOL/L — SIGNIFICANT CHANGE UP (ref 3.5–5.3)
POTASSIUM SERPL-SCNC: 3.5 MMOL/L — SIGNIFICANT CHANGE UP (ref 3.5–5.3)
RBC # BLD: 3.97 M/UL — SIGNIFICANT CHANGE UP (ref 3.8–5.2)
RBC # FLD: 13.8 % — SIGNIFICANT CHANGE UP (ref 10.3–14.5)
SODIUM SERPL-SCNC: 141 MMOL/L — SIGNIFICANT CHANGE UP (ref 135–145)
WBC # BLD: 12.62 K/UL — HIGH (ref 3.8–10.5)
WBC # FLD AUTO: 12.62 K/UL — HIGH (ref 3.8–10.5)

## 2021-08-24 PROCEDURE — 99213 OFFICE O/P EST LOW 20 MIN: CPT

## 2021-08-24 RX ORDER — ACETAMINOPHEN 500 MG
1000 TABLET ORAL ONCE
Refills: 0 | Status: COMPLETED | OUTPATIENT
Start: 2021-08-24 | End: 2021-08-24

## 2021-08-24 RX ADMIN — OXYCODONE HYDROCHLORIDE 5 MILLIGRAM(S): 5 TABLET ORAL at 08:46

## 2021-08-24 RX ADMIN — CLOPIDOGREL BISULFATE 75 MILLIGRAM(S): 75 TABLET, FILM COATED ORAL at 08:46

## 2021-08-24 RX ADMIN — PANTOPRAZOLE SODIUM 40 MILLIGRAM(S): 20 TABLET, DELAYED RELEASE ORAL at 08:46

## 2021-08-24 RX ADMIN — Medication 1000 MILLIGRAM(S): at 05:33

## 2021-08-24 RX ADMIN — Medication 50 MICROGRAM(S): at 05:32

## 2021-08-24 RX ADMIN — Medication 400 MILLIGRAM(S): at 05:30

## 2021-08-24 RX ADMIN — Medication 1000 MILLIGRAM(S): at 12:22

## 2021-08-24 RX ADMIN — LOSARTAN POTASSIUM 50 MILLIGRAM(S): 100 TABLET, FILM COATED ORAL at 08:46

## 2021-08-24 RX ADMIN — OXYCODONE HYDROCHLORIDE 5 MILLIGRAM(S): 5 TABLET ORAL at 09:23

## 2021-08-24 RX ADMIN — Medication 1000 MILLIGRAM(S): at 00:43

## 2021-08-24 RX ADMIN — Medication 1000 MILLIGRAM(S): at 11:21

## 2021-08-24 RX ADMIN — Medication 100 MILLIGRAM(S): at 00:43

## 2021-08-24 RX ADMIN — Medication 1000 MILLIGRAM(S): at 00:46

## 2021-08-24 NOTE — DISCHARGE NOTE NURSING/CASE MANAGEMENT/SOCIAL WORK - NSDCPEFALRISK_GEN_ALL_CORE
For information on Fall & injury Prevention, visit [email or web address]

## 2021-08-24 NOTE — PHYSICAL THERAPY INITIAL EVALUATION ADULT - GENERAL OBSERVATIONS, REHAB EVAL
Pt rec'd sitting up at edge of bed, RUE secured in sling, pt endorses right shoulder pain, cooperative with PT.

## 2021-08-24 NOTE — DISCHARGE NOTE NURSING/CASE MANAGEMENT/SOCIAL WORK - NSDCPNINST_GEN_ALL_CORE
Call MD for any increase in pain, numbness, tingling; fever over 101F; swelling, redness, oozing at incision site; pain in calf.

## 2021-08-24 NOTE — PROGRESS NOTE ADULT - SUBJECTIVE AND OBJECTIVE BOX
Orthopedics     POD 1 R rTSA  Pt seen and examined at the bedside. Pain is well controlled at this time. Pt reports feeling well, no concerns at this time.     Vital Signs Last 24 Hrs  T(C): 37.1 (24 Aug 2021 05:21), Max: 37.1 (24 Aug 2021 05:21)  T(F): 98.7 (24 Aug 2021 05:21), Max: 98.7 (24 Aug 2021 05:21)  HR: 63 (24 Aug 2021 05:21) (63 - 72)  BP: 135/44 (24 Aug 2021 05:21) (103/79 - 146/53)  BP(mean): --  RR: 16 (23 Aug 2021 20:32) (12 - 16)  SpO2: 97% (24 Aug 2021 05:21) (95% - 100%)      Exam:  GEN: NAD, awake and alert.  RIGHT Upper Extremity:   Dressing clean dry and intact  Sensation grossly intact  Motor grossly intact throughout axillary/musculocutaneous/radial/median/ulnar/AIN/PIN nerves  + radial pulse  Compartments soft and compressible      A/P:  75yF s/p R total shoulder arthroplasty POD #1  -Pain control prn  - DVT ppx: Only plavix to go home. Lovenox okay while admitted  - NWB RUE in shoulder immobilizer/PT/OOB as tolerated   - FU am labs  - Med mgmt, continue home meds.  -FU repeat neuro exam in am  -D/c to home today after patient works with PT this morning.   -FU in office with Dr Marks

## 2021-08-24 NOTE — DISCHARGE NOTE NURSING/CASE MANAGEMENT/SOCIAL WORK - PATIENT PORTAL LINK FT
You can access the FollowMyHealth Patient Portal offered by [facility] by registering at the following website: [email or web address]. By joining [employer]’s FollowMyHealth portal, you will also be able to view your health information using other applications (apps) compatible with our system.

## 2021-08-24 NOTE — PROGRESS NOTE ADULT - SUBJECTIVE AND OBJECTIVE BOX
PCP: [facility] [place]    CHIEF COMPLAINT: OA    HISTORY OF THE PRESENT ILLNESS: this is a 76 yo female with PMH of subclavian artery and carotid stenosis, HLD, HTN, PVD s/p bilateral iliac stents, hypothyroid, fibromyalgia, GERD, OA of the right shoulder with progressive pain with ambulation, climbing stairs and inc pain at night, who failed the usual modalities for shoulder pain and is now s/p right reverse total shoulder replacement. Hospitalist service consulted for medical comanagement.     8/24: pt seen and examined this am. c/o shoulder pain. No sob/chest pain. Tolerating po. no difficulty voiding. ambulating with physical therapy.     REVIEW OF SYSTEMS: All 10 systems reviewed in detailed and found to be negative with the exception of what has already been described above      Vital Signs Last 24 Hrs  T(C): 37.2 (24 Aug 2021 08:56), Max: 37.2 (24 Aug 2021 08:56)  T(F): 98.9 (24 Aug 2021 08:56), Max: 98.9 (24 Aug 2021 08:56)  HR: 60 (24 Aug 2021 08:56) (60 - 72)  BP: 167/62 (24 Aug 2021 08:56) (103/79 - 167/62)  BP(mean): 90 (24 Aug 2021 08:56) (90 - 90)  RR: 16 (24 Aug 2021 08:56) (12 - 16)  SpO2: 97% (24 Aug 2021 08:56) (95% - 99%)    PHYSICAL EXAM:    GENERAL: Comfortable, no acute distress   HEAD:  Normocephalic, atraumatic  EYES: EOMI, PERRLA  HEENT: Moist mucous membranes  NECK: Supple, No JVD  NERVOUS SYSTEM:  Alert & Oriented X3, Motor Strength 5/5 B/L upper and lower extremities  CHEST/LUNG: Clear to auscultation bilaterally  HEART: Regular rate and rhythm  ABDOMEN: Soft, Nontender, Nondistended, Bowel sounds present  GENITOURINARY: Voiding, no palpable bladder  EXTREMITIES:   No clubbing, cyanosis, or edema  MUSCULOSKELETAL- Right shoulder in sling. dressing intact.  SKIN-no rash  LABS:                        11.9   12.62 )-----------( 129      ( 24 Aug 2021 07:20 )             37.0     08-24    141  |  108  |  25<H>  ----------------------------<  117<H>  3.5   |  30  |  0.54    Ca    8.6      24 Aug 2021 07:20    MEDICATIONS  (STANDING):  acetaminophen   Tablet .. 1000 milliGRAM(s) Oral every 6 hours  atorvastatin 20 milliGRAM(s) Oral at bedtime  clopidogrel Tablet 75 milliGRAM(s) Oral daily  enoxaparin Injectable 40 milliGRAM(s) SubCutaneous daily  lactated ringers. 1000 milliLiter(s) (75 mL/Hr) IV Continuous <Continuous>  levothyroxine 50 MICROGram(s) Oral daily  losartan 50 milliGRAM(s) Oral daily  pantoprazole    Tablet 40 milliGRAM(s) Oral daily  polyethylene glycol 3350 17 Gram(s) Oral at bedtime  senna 2 Tablet(s) Oral at bedtime    MEDICATIONS  (PRN):  HYDROmorphone  Injectable 0.5 milliGRAM(s) SubCutaneous every 4 hours PRN Severe Pain (7 - 10)  ondansetron Injectable 4 milliGRAM(s) IV Push every 6 hours PRN Nausea and/or Vomiting  oxyCODONE    IR 5 milliGRAM(s) Oral every 4 hours PRN Mild Pain (1 - 3)  oxyCODONE    IR 10 milliGRAM(s) Oral every 4 hours PRN Moderate Pain (4 - 6)  prochlorperazine   Injectable 10 milliGRAM(s) IV Push every 8 hours PRN Nausea and/or Vomiting      IMPRESSION: 74yo female with above pmh a/w    #OA right shoulder  #S/P right reverse total shoulder replacement  -POD#1  -pain control with prn tylenol/oxycodone, dilaudid for severe pain  -physical therapy  -incentive spirometry  -bowel regimen.     #subclavian artery and carotid stenosis  #PVD s/p bilateral iliac stents  - cont plavix 75mg daily/statin.    #current smoker  - 1/2 PPD x 30-40yrs  - offered nicotine patch, pt declined. reports that it makes her anxious    #HTN  -cont home meds: losartan 50mg qd   -hold hctz.     #HLD  - cont atorvastatin 20mg qd    #hypothyroid  - cont levothyroxine 50mcg    #GERD  - on PPI    #fibromyalgia  - stable    # VTE prophylaxis  -lovenox per a/c services.

## 2021-08-24 NOTE — PHYSICAL THERAPY INITIAL EVALUATION ADULT - PERTINENT HX OF CURRENT PROBLEM, REHAB EVAL
76 yo female with PMH of subclavian artery and carotid stenosis, HLD, HTN, PVD s/p bilateral iliac stents, hypothyroid, fibromyalgia, GERD, OA of the right shoulder with progressive pain with ambulation, climbing stairs and inc pain at night, who failed the usual modalities for shoulder pain and is now s/p right reverse total shoulder replacement.

## 2021-08-24 NOTE — PHYSICAL THERAPY INITIAL EVALUATION ADULT - RANGE OF MOTION EXAMINATION, REHAB EVAL
RUE exam limited by severe pain/Left UE ROM was WFL (within functional limits)/bilateral lower extremity ROM was WFL (within functional limits)

## 2021-08-24 NOTE — PHYSICAL THERAPY INITIAL EVALUATION ADULT - MODALITIES TREATMENT COMMENTS
Pt educated on reverse total shoulder arthoplasty movement precautions, falls risk reduction, therex review and the overall impact on the pt's ADLs and safety.

## 2021-08-25 RX ORDER — OXYCODONE 5 MG/1
5 TABLET ORAL
Qty: 40 | Refills: 0 | Status: ACTIVE | COMMUNITY
Start: 2021-08-25 | End: 1900-01-01

## 2021-08-31 DIAGNOSIS — I65.29 OCCLUSION AND STENOSIS OF UNSPECIFIED CAROTID ARTERY: ICD-10-CM

## 2021-08-31 DIAGNOSIS — M19.011 PRIMARY OSTEOARTHRITIS, RIGHT SHOULDER: ICD-10-CM

## 2021-08-31 DIAGNOSIS — Z90.49 ACQUIRED ABSENCE OF OTHER SPECIFIED PARTS OF DIGESTIVE TRACT: ICD-10-CM

## 2021-08-31 DIAGNOSIS — M50.20 OTHER CERVICAL DISC DISPLACEMENT, UNSPECIFIED CERVICAL REGION: ICD-10-CM

## 2021-08-31 DIAGNOSIS — Z79.1 LONG TERM (CURRENT) USE OF NON-STEROIDAL ANTI-INFLAMMATORIES (NSAID): ICD-10-CM

## 2021-08-31 DIAGNOSIS — M79.7 FIBROMYALGIA: ICD-10-CM

## 2021-08-31 DIAGNOSIS — I73.9 PERIPHERAL VASCULAR DISEASE, UNSPECIFIED: ICD-10-CM

## 2021-08-31 DIAGNOSIS — E78.5 HYPERLIPIDEMIA, UNSPECIFIED: ICD-10-CM

## 2021-08-31 DIAGNOSIS — Z79.891 LONG TERM (CURRENT) USE OF OPIATE ANALGESIC: ICD-10-CM

## 2021-08-31 DIAGNOSIS — F17.210 NICOTINE DEPENDENCE, CIGARETTES, UNCOMPLICATED: ICD-10-CM

## 2021-08-31 DIAGNOSIS — Z79.02 LONG TERM (CURRENT) USE OF ANTITHROMBOTICS/ANTIPLATELETS: ICD-10-CM

## 2021-08-31 DIAGNOSIS — I10 ESSENTIAL (PRIMARY) HYPERTENSION: ICD-10-CM

## 2021-09-13 ENCOUNTER — APPOINTMENT (OUTPATIENT)
Dept: ORTHOPEDIC SURGERY | Facility: CLINIC | Age: 76
End: 2021-09-13
Payer: MEDICARE

## 2021-09-13 VITALS
BODY MASS INDEX: 26.43 KG/M2 | DIASTOLIC BLOOD PRESSURE: 69 MMHG | HEART RATE: 68 BPM | WEIGHT: 140 LBS | SYSTOLIC BLOOD PRESSURE: 118 MMHG | HEIGHT: 61 IN

## 2021-09-13 DIAGNOSIS — M25.511 PAIN IN RIGHT SHOULDER: ICD-10-CM

## 2021-09-13 PROCEDURE — 99024 POSTOP FOLLOW-UP VISIT: CPT

## 2021-09-13 PROCEDURE — 73030 X-RAY EXAM OF SHOULDER: CPT | Mod: RT

## 2021-09-13 NOTE — ADDENDUM
[FreeTextEntry1] : Documented by Robbie Ashley acting as a scribe for Dr. Marks on 09/13/2021. \par \par All medical record entries made by the Scribe were at my, Dr. Marks's, direction and\par personally dictated by me on 09/13/2021. I have reviewed the chart and agree that the record\par accurately reflects my personal performance of the history, physical exam, procedure and imaging.

## 2021-09-13 NOTE — HISTORY OF PRESENT ILLNESS
[___ Weeks Post Op] : [unfilled] weeks post op [3] : the patient reports pain that is 3/10 in severity [Clean/Dry/Intact] : clean, dry and intact [Xray (Date:___)] : [unfilled] Xray -  [Doing Well] : is doing well [Excellent Pain Control] : has excellent pain control [Chills] : no chills [Fever] : no fever [Nausea] : no nausea [Vomiting] : no vomiting [de-identified] : spo R reverse shoulder replacement. DOS: 08/23/2021. [de-identified] : ROLANDA DUNAWAY is a 75 year female being seen for first post op R reverse shoulder replacement, 3 weeks ago. ROLANDA presents today wearing L shoulder sling.  She denies fever chills, redness around/near incision site and numbness/tingling. She denies nausea/vomiting and admits to their appetite being back to normal since surgery. [de-identified] : Right Shoulder\par \par Incisions are clean, dry and intact. No surrounding erythema. No drainage. Wound appears to be healing well. ROM passive FF 0-40.  Motor and sensation are intact distally. He has full range of motion of all fingers with capillary refill of <2 seconds throughout. She has good nerve function and median nerve, ulnar, radial, PIN, AIN. She has no sensory deficits over the C5-T1 nerve roots. Radial pulses 2+. Able to make an A-OK sign and thumbs up sign: flex/extend thumb, cross middle finger over index.\par \par Full ROM Wrist/ Elbow  [de-identified] : 3 view xrays of pts Right shoulder were performed today and available for me to review. They demonstrate adequate position of hardware. No fracture. No dislocation. No other deformities.  [de-identified] : ROLANDA is a 75 year female who is doing extremely well and is without complaints. She presents today in her sling with pillow. Surgical sites are clean and dry without warmth or erythema, pt denies fever or chills.  She is to remain in the sling outdoor, as precautionary measure and continue with being fully nonweightbearing to this extremity. She will start pendulum exercises and slight shoulder movement as tolerated. She will start PT in 4 weeks from today 2-3x/week alongside HEP until we see them again in 4 weeks for clinical reassessment. She may use tylenol, or advil or Aleve prn for pain. She agrees to the latter plan and does not have any further questions or concerns.

## 2021-10-08 ENCOUNTER — APPOINTMENT (OUTPATIENT)
Dept: ORTHOPEDIC SURGERY | Facility: CLINIC | Age: 76
End: 2021-10-08

## 2021-10-11 ENCOUNTER — APPOINTMENT (OUTPATIENT)
Dept: ORTHOPEDIC SURGERY | Facility: CLINIC | Age: 76
End: 2021-10-11
Payer: MEDICARE

## 2021-10-11 VITALS
HEART RATE: 61 BPM | BODY MASS INDEX: 26.43 KG/M2 | WEIGHT: 140 LBS | DIASTOLIC BLOOD PRESSURE: 74 MMHG | SYSTOLIC BLOOD PRESSURE: 126 MMHG | HEIGHT: 61 IN

## 2021-10-11 DIAGNOSIS — Z00.00 ENCOUNTER FOR GENERAL ADULT MEDICAL EXAMINATION W/OUT ABNORMAL FINDINGS: ICD-10-CM

## 2021-10-11 PROCEDURE — 99024 POSTOP FOLLOW-UP VISIT: CPT

## 2021-10-11 NOTE — HISTORY OF PRESENT ILLNESS
[___ Weeks Post Op] : [unfilled] weeks post op [2] : the patient reports pain that is 2/10 in severity [Clean/Dry/Intact] : clean, dry and intact [Healed] : healed [Neuro Intact] : an unremarkable neurological exam [Vascular Intact] : ~T peripheral vascular exam normal [Doing Well] : is doing well [Excellent Pain Control] : has excellent pain control [Chills] : no chills [Fever] : no fever [Nausea] : no nausea [Vomiting] : no vomiting [Discharge] : absent of discharge [de-identified] : spo R reverse shoulder replacement. DOS: 08/23/2021. [de-identified] : ROLANDA DUNAWAY is a 75 year female being seen for 2nd post op R reverse shoulder replacement, 7 weeks ago. ROLANDA presents today wearing L shoulder sling.  She denies fever chills, redness around/near incision site and numbness/tingling. She denies nausea/vomiting and admits to their appetite being back to normal since surgery. She has not started PT yet. She endorses pain over biceps insertion.  [de-identified] : Right Shoulder\par \par Incisions are clean, dry and intact. No surrounding erythema. No drainage. Wound appears to be healing well. ROM passive FF 0-50, ER to 20. TTP biceps groove. Motor and sensation are intact distally. He has full range of motion of all fingers with capillary refill of <2 seconds throughout. She has good nerve function and median nerve, ulnar, radial, PIN, AIN. She has no sensory deficits over the C5-T1 nerve roots. Radial pulses 2+. Able to make an A-OK sign and thumbs up sign: flex/extend thumb, cross middle finger over index.\par \par Full ROM Wrist/ Elbow  [de-identified] : No new xray performed today.  [de-identified] : ROLANDA is a 75 year female who is doing extremely well and is without complaints. She presents today in her sling. Surgical sites are clean and dry without warmth or erythema, pt denies fever or chills.  She will transition from sling at this time. She will start PT from today 2-3x/week alongside HEP until we see them again in 6 weeks for clinical reassessment. She may use tylenol, or advil or Aleve prn for pain. She agrees to the latter plan and does not have any further questions or concerns.

## 2021-10-11 NOTE — ADDENDUM
[FreeTextEntry1] : Documented by Robbie Ashley acting as a scribe for Dr. Marks on 10/11/2021. \par \par All medical record entries made by the Scribe were at my, Dr. Marks's, direction and\par personally dictated by me on 10/11/2021. I have reviewed the chart and agree that the record\par accurately reflects my personal performance of the history, physical exam, procedure and imaging.

## 2021-11-29 NOTE — DISCHARGE NOTE PROVIDER - NSDCFUSCHEDAPPT_GEN_ALL_CORE_FT
Addended by: MARIA GUADALUPE LEON on: 11/29/2021 05:34 PM     Modules accepted: Orders, SmartSet     ROLANDA DUNAWAY ; 09/07/2021 ; [facility] ; 10/08/2021 ; [facility]

## 2021-12-20 ENCOUNTER — APPOINTMENT (OUTPATIENT)
Dept: ORTHOPEDIC SURGERY | Facility: CLINIC | Age: 76
End: 2021-12-20
Payer: MEDICARE

## 2021-12-20 VITALS
SYSTOLIC BLOOD PRESSURE: 136 MMHG | DIASTOLIC BLOOD PRESSURE: 70 MMHG | HEART RATE: 81 BPM | HEIGHT: 61 IN | BODY MASS INDEX: 26.43 KG/M2 | WEIGHT: 140 LBS

## 2021-12-20 PROCEDURE — 73030 X-RAY EXAM OF SHOULDER: CPT | Mod: RT

## 2021-12-20 PROCEDURE — 99214 OFFICE O/P EST MOD 30 MIN: CPT

## 2021-12-20 NOTE — DISCUSSION/SUMMARY
[de-identified] : ROLANDA DUNAWAY is a 75 year female being seen for post op R reverse shoulder replacement, 4 months ago. She is noticing some pain over anterior shoulder, and lateral aspect of shoulder specifically over deltoid. In addition, patient reports pain in Left shoulder. She reports pain and numbness in 2nd digit, and occasional neck stiffness. She has been doing PT for Right shoulder after surgery, and did perform couple of stretches for Left too with limited relief. \par \par At this point, patient ROM has improved, and her pain has almost subsided, she is doing well and happy with her progress so far. She will continue physical therapy at this time, along with HEP. I emphasized that physical therapy is integral part of regaining ROM, and patient should stay compliant with PT and post operative protocol. Patient will follow up in 3 months for repeat clinical assessment. \par \par In regards to her Left shoulder and hand, she has numbness and pain secondary to radicular pain for which I referred her to Dr. Tanner for further care. All questions were answered and the patient verbalized understanding. The patient is in agreement with this treatment plan.

## 2021-12-20 NOTE — HISTORY OF PRESENT ILLNESS
[Improving] : improving [___ mths] : [unfilled] month(s) ago [de-identified] : ROLANDA DUNAWAY is a 75 year female being seen for post op R reverse shoulder replacement, 4 months ago. She is noticing some pain over anterior shoulder, and lateral aspect of shoulder specifically over deltoid. In addition, patient reports pain in Left shoulder. She reports pain and numbness in 2nd digit, and occasional neck stiffness. She has been doing PT for Right shoulder after surgery, and did perform couple of stretches for Left too with limited relief.

## 2021-12-20 NOTE — PHYSICAL EXAM
[de-identified] : Right Shoulder\par \par Incisions are clean, dry and intact. No surrounding erythema. No drainage. Wound appears to be healing well. ROM passive FF 0-50, ER to 20. TTP biceps groove. Motor and sensation are intact distally. He has full range of motion of all fingers with capillary refill of <2 seconds throughout. She has good nerve function and median nerve, ulnar, radial, PIN, AIN. She has no sensory deficits over the C5-T1 nerve roots. Radial pulses 2+. Able to make an A-OK sign and thumbs up sign: flex/extend thumb, cross middle finger over index.\par \par Full ROM Wrist/ Elbow. The surgical incision site(s) was clean, dry and intact, healed and absent of discharge. Additional findings included an unremarkable neurological exam and peripheral vascular exam normal. \par \par Left Shoulder\par ·	Inspection/Palpation: no tenderness, swelling or deformities\par ·	Range of Motion: full and painless in all planes, no crepitus\par ·	Strength: forward elevation in scapular plane 5/5, internal rotation 5/5, external rotation 5/5, adduction 5/5 and abduction 5/5\par ·	Stability: no joint instability on provocative testing\par ·	Tests: Shultz test negative, Neer sign negative, negative drop arm test secondary to pain, bear hug test negative, Napolean sign negative, cross arm adduction negative, lift off sign positive, hornblowers sign negative, speeds test negative, Yergason's test negative, no bicipital groove tenderness, Chen's Active Compression test negative  [de-identified] : 3 view xrays of pts Right shoulder were performed today and available for me to review. They demonstrate adequate position of hardware. No fracture. No dislocation. No other deformities.

## 2021-12-20 NOTE — ADDENDUM
[FreeTextEntry1] : Documented by Robbie Ashley acting as a scribe for Dr. Marks on 12/20/2021. \par \par All medical record entries made by the Scribe were at my, Dr. Marks's, direction and\par personally dictated by me on 12/20/2021. I have reviewed the chart and agree that the record\par accurately reflects my personal performance of the history, physical exam, procedure and imaging.

## 2022-03-21 ENCOUNTER — APPOINTMENT (OUTPATIENT)
Dept: ORTHOPEDIC SURGERY | Facility: CLINIC | Age: 77
End: 2022-03-21
Payer: MEDICARE

## 2022-03-21 VITALS
HEIGHT: 61 IN | SYSTOLIC BLOOD PRESSURE: 163 MMHG | WEIGHT: 140 LBS | BODY MASS INDEX: 26.43 KG/M2 | HEART RATE: 76 BPM | DIASTOLIC BLOOD PRESSURE: 77 MMHG

## 2022-03-21 DIAGNOSIS — Z98.890 OTHER SPECIFIED POSTPROCEDURAL STATES: ICD-10-CM

## 2022-03-21 PROCEDURE — 99213 OFFICE O/P EST LOW 20 MIN: CPT

## 2022-03-21 NOTE — PHYSICAL EXAM
[de-identified] : Physical Exam:\par General: Well appearing, no acute distress\par Neurologic: A&Ox3, No focal deficits\par Head: NCAT without abrasions, lacerations, or ecchymosis to head, face, or scalp\par Eyes: No scleral icterus, no gross abnormalities\par Respiratory: Equal chest wall expansion bilaterally, no accessory muscle use\par Lymphatic: No lymphadenopathy palpated\par Skin: Warm and dry\par Psychiatric: Normal mood and affect \par \par Right Shoulder\par \par Incisions are clean, dry and intact. No surrounding erythema. No drainage. Wound appears to be healing well. ROM passive FF 0-140, ER to 40. Motor and sensation are intact distally. Strength forward elevation in scapular plane 5/5, internal rotation 5/5, external rotation 5/5, adduction 5/5 and abduction 5/5. He has full range of motion of all fingers with capillary refill of <2 seconds throughout. She has good nerve function and median nerve, ulnar, radial, PIN, AIN. She has no sensory deficits over the C5-T1 nerve roots. Radial pulses 2+. Able to make an A-OK sign and thumbs up sign: flex/extend thumb, cross middle finger over index.\par \par Full ROM Wrist/ Elbow. The surgical incision site(s) was clean, dry and intact, healed and absent of discharge. Additional findings included an unremarkable neurological exam and peripheral vascular exam normal. \par

## 2022-03-21 NOTE — DISCUSSION/SUMMARY
[de-identified] : ROLANDA DUNAWAY is a 75 year female being seen for post op R reverse shoulder replacement, 7 months ago. Currently, she reports significant relief in shoulder movements. She does have occasional pain in anterolateral aspect of shoulder. She is happy with her progress thus far.  of stretches for Left too with limited relief. \par \par At this point, patient ROM has greatly improved, and her pain has subsided, she is doing well and happy with her progress so far. She will continue HEP at this time. Conservative measures of treatment include rest until asymptomatic, activity avoidance, NSAID's PRN, application to ice to the area 2-3x daily for 20 minutes, with gradual return to activities. Patient will follow up in 5 months for repeat clinical assessment. All questions were answered and the patient verbalized understanding. The patient is in agreement with this treatment plan.

## 2022-03-21 NOTE — HISTORY OF PRESENT ILLNESS
[Improving] : improving [___ mths] : [unfilled] month(s) ago [0] : a current pain level of 0/10 [de-identified] : ROLANDA DUNAWAY is a 75 year female being seen for post op R reverse shoulder replacement, 7 months ago. Currently, she reports significant relief in shoulder movements. She does have occasional pain in anterolateral aspect of shoulder. She is happy with her progress thus far.  of stretches for Left too with limited relief.

## 2022-03-21 NOTE — ADDENDUM
[FreeTextEntry1] : Documented by Robbie Ashley acting as a scribe for Dr. Marks on 03/21/2022. \par \par All medical record entries made by the Scribe were at my, Dr. Marks's, direction and\par personally dictated by me on 03/21/2022. I have reviewed the chart and agree that the record\par accurately reflects my personal performance of the history, physical exam, procedure and imaging.

## 2022-09-19 ENCOUNTER — APPOINTMENT (OUTPATIENT)
Dept: ORTHOPEDIC SURGERY | Facility: CLINIC | Age: 77
End: 2022-09-19

## 2022-09-19 DIAGNOSIS — M19.011 PRIMARY OSTEOARTHRITIS, RIGHT SHOULDER: ICD-10-CM

## 2022-09-19 PROCEDURE — 99213 OFFICE O/P EST LOW 20 MIN: CPT

## 2022-09-20 PROBLEM — M19.011 GLENOHUMERAL ARTHRITIS, RIGHT: Status: ACTIVE | Noted: 2021-07-19

## 2022-09-20 NOTE — ADDENDUM
[FreeTextEntry1] : Documented by Padmini June acting as a scribe for Dr. Marks and Johnathan Nesbitt PA-C on 09/19/2022. \par \par All medical record entries made by the Scribe were at my, Dr. Marks, and Johnathan Nesbitt's, direction and\par personally dictated by me on 09/19/2022. I have reviewed the chart and agree that the record\par accurately reflects my personal performance of the history, physical exam, procedure and imaging.

## 2022-09-20 NOTE — HISTORY OF PRESENT ILLNESS
[Improving] : improving [___ mths] : [unfilled] month(s) ago [0] : a current pain level of 0/10 [de-identified] : ROLANDA DUNAWAY is a 75 year female being seen for post op R reverse shoulder replacement, 14 months ago. She states she is doing well at this time. Currently, she reports significant relief in shoulder movements. She does have occasional pain in anterolateral aspect of shoulder. She is happy with her progress thus far.  She stretches for left too with limited relief.

## 2022-09-20 NOTE — PHYSICAL EXAM
[de-identified] : Physical Exam:\par General: Well appearing, no acute distress\par Neurologic: A&Ox3, No focal deficits\par Head: NCAT without abrasions, lacerations, or ecchymosis to head, face, or scalp\par Eyes: No scleral icterus, no gross abnormalities\par Respiratory: Equal chest wall expansion bilaterally, no accessory muscle use\par Lymphatic: No lymphadenopathy palpated\par Skin: Warm and dry\par Psychiatric: Normal mood and affect \par \par Right Shoulder\par \par Incisions are clean, dry and intact. No surrounding erythema. No drainage. Wound appears to be healing well. ROM passive FF 0-140, ER to 40, IR to L1. Motor and sensation are intact distally. Strength forward elevation in scapular plane 5/5, internal rotation 5/5, external rotation 5/5, adduction 5/5 and abduction 5/5. He has full range of motion of all fingers with capillary refill of <2 seconds throughout. She has good nerve function and median nerve, ulnar, radial, PIN, AIN. She has no sensory deficits over the C5-T1 nerve roots. Radial pulses 2+. Able to make an A-OK sign and thumbs up sign: flex/extend thumb, cross middle finger over index.\par \par Full ROM Wrist/ Elbow. The surgical incision site(s) was clean, dry and intact, healed and absent of discharge. Additional findings included an unremarkable neurological exam and peripheral vascular exam normal. \par

## 2022-09-20 NOTE — DISCUSSION/SUMMARY
[de-identified] : ROLANDA DUNAWAY is a 75 year female being seen for post op R reverse shoulder replacement, 14 months ago. She states she is doing well at this time. Currently, she reports significant relief in shoulder movements. She does have occasional pain in anterolateral aspect of shoulder. She is happy with her progress thus far.  She stretches for left too with limited relief. \par \par Patient is doing well at this time and should continue exercises at home. Patient will follow up in 1 year for repeat clinical assessment. All questions were answered and the patient verbalized understanding. The patient is in agreement with this treatment plan.

## 2023-01-10 NOTE — H&P PST ADULT - DOCUMENT STATUS
Authored by Resident/PA/NP 51 yr old female with history of obesity, diabetes , varicose veins, asthma and  anemia presents with c/o lower leg heaviness, discomfort and occasional swelling.  Pt had sclerotherapy in 2019 with temporary relief. Pt works as a health care aide and standing on feet makes symptoms worse . LAying down and elevating legs gives relief. Pt is scheduled for  a left leg phlebectomy with Dr. Ruth Frye on 23. Covid PCR done 23 neg will obtain a medical clearance.  OPIOID RISK TOOL    PILI EACH BOX THAT APPLIES AND ADD TOTALS AT THE END    FAMILY HISTORY OF SUBSTANCE ABUSE                 FEMALE         MALE                                                Alcohol                             [  ]1 pt          [  ]3pts                                               Illegal Durgs                     [  ]2 pts        [  ]3pts                                               Rx Drugs                           [  ]4 pts        [  ]4 pts    PERSONAL HISTORY OF SUBSTANCE ABUSE                                                                                          Alcohol                             [  ]3 pts       [  ]3 pts                                               Illegal Durgs                     [  ]4 pts        [  ]4 pts                                               Rx Drugs                           [  ]5 pts        [  ]5 pts    AGE BETWEEN 16-45 YEARS                                      [  ]1 pt         [  ]1 pt    HISTORY OF PREADOLESCENT   SEXUAL ABUSE                                                             [  ]3 pts        [  ]0pts    PSYCHOLOGICAL DISEASE                     ADD, OCD, Bipolar, Schizophrenia        [  ]2 pts         [  ]2 pts                      Depression                                               [  ]1 pt           [  ]1 pt           SCORING TOTAL   (add numbers and type here)              (*0**)                                     A score of 3 or lower indicated LOW risk for future opiod abuse  A score of 4 to 7 indicated moderate risk for future opiod abuse  A score of 8 or higher indicates a high risk for opiod abuse  CAPRINI VTE 2.0 SCORE [CLOT updated 2019]    AGE RELATED RISK FACTORS                                                       MOBILITY RELATED FACTORS  [X ] Age 41-60 years                                            (1 Point)                    [ ] Bed rest                                                        (1 Point)  [ ] Age: 61-74 years                                           (2 Points)                  [ ] Plaster cast                                                   (2 Points)  [ ] Age= 75 years                                              (3 Points)                    [ ] Bed bound for more than 72 hours                 (2 Points)    DISEASE RELATED RISK FACTORS                                               GENDER SPECIFIC FACTORS  [X ] Edema in the lower extremities                       (1 Point)              [ ] Pregnancy                                                     (1 Point)  [ ] Varicose veins                                               (1 Point)                     [ ] Post-partum < 6 weeks                                   (1 Point)             [X ] BMI > 25 Kg/m2                                            (1 Point)                     [ ] Hormonal therapy  or oral contraception          (1 Point)                 [ ] Sepsis (in the previous month)                        (1 Point)               [ ] History of pregnancy complications                 (1 point)  [ ] Pneumonia or serious lung disease                                               [ ] Unexplained or recurrent                     (1 Point)           (in the previous month)                               (1 Point)  [ ] Abnormal pulmonary function test                     (1 Point)                 SURGERY RELATED RISK FACTORS  [ ] Acute myocardial infarction                              (1 Point)               [ ]  Section                                             (1 Point)  [ ] Congestive heart failure (in the previous month)  (1 Point)      [ ] Minor surgery                                                  (1 Point)   [ ] Inflammatory bowel disease                             (1 Point)               [ ] Arthroscopic surgery                                        (2 Points)  [ ] Central venous access                                      (2 Points)                [X ] General surgery lasting more than 45 minutes (2 points)  [ ] Malignancy- Present or previous                   (2 Points)                [ ] Elective arthroplasty                                         (5 points)    [ ] Stroke (in the previous month)                          (5 Points)                                                                                                                                                           HEMATOLOGY RELATED FACTORS                                                 TRAUMA RELATED RISK FACTORS  [ ] Prior episodes of VTE                                     (3 Points)                [ ] Fracture of the hip, pelvis, or leg                       (5 Points)  [ ] Positive family history for VTE                         (3 Points)             [ ] Acute spinal cord injury (in the previous month)  (5 Points)  [ ] Prothrombin 21537 A                                     (3 Points)               [ ] Paralysis  (less than 1 month)                             (5 Points)  [ ] Factor V Leiden                                             (3 Points)                  [ ] Multiple Trauma within 1 month                        (5 Points)  [ ] Lupus anticoagulants                                     (3 Points)                                                           [ ] Anticardiolipin antibodies                               (3 Points)                                                       [ ] High homocysteine in the blood                      (3 Points)                                             [ ] Other congenital or acquired thrombophilia      (3 Points)                                                [ ] Heparin induced thrombocytopenia                  (3 Points)                                     Total Score [     5     ]

## 2023-03-13 ENCOUNTER — APPOINTMENT (OUTPATIENT)
Dept: ORTHOPEDIC SURGERY | Facility: CLINIC | Age: 78
End: 2023-03-13
Payer: MEDICARE

## 2023-03-13 VITALS — WEIGHT: 140 LBS | HEIGHT: 61 IN | BODY MASS INDEX: 26.43 KG/M2

## 2023-03-13 PROCEDURE — 20611 DRAIN/INJ JOINT/BURSA W/US: CPT | Mod: LT

## 2023-03-13 PROCEDURE — 73030 X-RAY EXAM OF SHOULDER: CPT | Mod: 50

## 2023-03-13 PROCEDURE — 99214 OFFICE O/P EST MOD 30 MIN: CPT | Mod: 25

## 2023-03-13 RX ORDER — CYCLOBENZAPRINE HYDROCHLORIDE 5 MG/1
5 TABLET, FILM COATED ORAL 3 TIMES DAILY
Qty: 15 | Refills: 0 | Status: ACTIVE | COMMUNITY
Start: 2023-03-13 | End: 1900-01-01

## 2023-03-13 NOTE — DISCUSSION/SUMMARY
[de-identified] : We had a thorough discussion regarding the nature of her pain, the pathophysiology, as well as all treatment options. A prescription of Cyclobenzaprine HCl was given to be taken as directed. Pt due to her acute pain elected for an ultrasound guided corticosteroid injection at today's visit and tolerated the procedure well. She should take it easy for the next 2-3 days while icing the joint. She will follow up with me on an as needed basis. All questions were answered and the patient verbalized understanding. The patient is in agreement with this treatment plan.

## 2023-03-13 NOTE — REVIEW OF SYSTEMS
[Negative] : Heme/Lymph [FreeTextEntry9] : post op R reverse shoulder replacement, 1.5+ years ago (DOS: 8/2021).  L shoulder pain

## 2023-03-13 NOTE — PHYSICAL EXAM
[de-identified] : Physical Exam:\par General: Well appearing, no acute distress\par Neurologic: A&Ox3, No focal deficits\par Head: NCAT without abrasions, lacerations, or ecchymosis to head, face, or scalp\par Eyes: No scleral icterus, no gross abnormalities\par Respiratory: Equal chest wall expansion bilaterally, no accessory muscle use\par Lymphatic: No lymphadenopathy palpated\par Skin: Warm and dry\par Psychiatric: Normal mood and affect \par \par Right Shoulder\par \par ROM passive FF 0-140, ER to 40, IR to L1. Motor and sensation are intact distally. Strength forward elevation in scapular plane 5/5, internal rotation 5/5, external rotation 5/5, adduction 5/5 and abduction 5/5. He has full range of motion of all fingers with capillary refill of <2 seconds throughout. She has good nerve function and median nerve, ulnar, radial, PIN, AIN. She has no sensory deficits over the C5-T1 nerve roots. Radial pulses 2+. Able to make an A-OK sign and thumbs up sign: flex/extend thumb, cross middle finger over index.\par \par Full ROM Wrist/ Elbow. \par \par Left shoulder\par ·	Inspection/Palpation: no tenderness, swelling or deformities\par ·	Range of Motion: no crepitus with ROM; Active FF 0 - 80; ER at side 0 - 15; IR to belly ; Passive FF 0 - 90 w/ pain ; ER at side 0 - 20 w/ pain \par ·	Strength: forward elevation in scapular plane 4/5, internal rotation 4/5, external rotation 4/5, adduction 4/5 and abduction 4/5\par ·	Stability: no joint instability on provocative testing\par ·	Tests: Unable to test \par  [de-identified] : 4 views of R shoulder were performed today and available for me to review. Results were discussed with the patient. They demonstrate no f/x, dislocation or other deformity.\par \par 4 views of L shoulder were performed today and available for me to review. Results were discussed with the patient. They demonstrate no f/x, dislocation or other deformity.

## 2023-03-13 NOTE — PROCEDURE
[de-identified] : US Guided Injection: left shoulder (Subacromial). \par Indication: pain\par \par A discussion was had with the patient regarding this procedure and all questions were answered. All risks, benefits and alternatives were discussed. These included but were not limited to bleeding, infection, and allergic reaction. Alcohol was used to clean the skin, and betadine was used to sterilize and prep the area in the posterior aspect of the left  shoulder. Ethyl chloride spray was then used as a topical anesthetic. A 22-gauge needle was used to inject 3cc 1% xylocaine, 2cc 0.25% bupivacaine and 1cc of 40mg/mL triamcinolone acetonide into the left subacromial space. An ultrasound guidance was used for adequate placement of needle.A sterile bandage was then applied. The patient tolerated the procedure well and there were no complications.

## 2023-03-13 NOTE — HISTORY OF PRESENT ILLNESS
[de-identified] : ROLANDA DUNAWAY is a 75 year female being seen for post op R reverse shoulder replacement, 1.5+ years ago (DOS: 8/2021). Currently, she admits to worsening R shoulder pain and new L shoulder pain. She endorses pain over her right trapezius.

## 2023-03-13 NOTE — ADDENDUM
[FreeTextEntry1] : Documented by Neha Cardenas acting as a scribe for Dr. Marks and Johnathan Nesbitt PA-C on 03/13/2023.   All medical record entries made by the Scribe were at my, Dr. Marks, and Johnathan Nesbitt's, direction and personally dictated by me on 03/13/2023. I have reviewed the chart and agree that the record accurately reflects my personal performance of the history, physical exam, procedure and imaging.

## 2023-05-15 ENCOUNTER — APPOINTMENT (OUTPATIENT)
Dept: ORTHOPEDIC SURGERY | Facility: CLINIC | Age: 78
End: 2023-05-15
Payer: MEDICARE

## 2023-05-15 VITALS — HEIGHT: 61 IN | BODY MASS INDEX: 22.66 KG/M2 | WEIGHT: 120 LBS

## 2023-05-15 DIAGNOSIS — M75.20 BICIPITAL TENDINITIS, UNSPECIFIED SHOULDER: ICD-10-CM

## 2023-05-15 PROCEDURE — 99214 OFFICE O/P EST MOD 30 MIN: CPT

## 2023-05-15 NOTE — DISCUSSION/SUMMARY
[de-identified] : We had a thorough discussion regarding the nature of her pain, the pathophysiology, as well as all treatment options. I discussed operative and non-operative treatment modalities. Patient may benefit from a reverse shoulder replacement of the left shoulder in the future, but would like to continue with conservative treatment at this time. Patient will follow up in 6-8 wks for repeat clinical assessment and possible cortisone injection of the left shoulder. All questions were answered and the patient verbalized understanding. The patient is in agreement with this treatment plan.

## 2023-05-15 NOTE — HISTORY OF PRESENT ILLNESS
[de-identified] : ROLANDA DUNAWAY is a 75 year female being seen for post op R reverse shoulder replacement, 1.5+ years ago (DOS: 8/2021), f/u left shoulder pain.Patient notes some relief from the subacromial injection in the left shoulder last visit, but her pain has returned.\par

## 2023-05-15 NOTE — PHYSICAL EXAM
[de-identified] : Physical Exam:\par General: Well appearing, no acute distress\par Neurologic: A&Ox3, No focal deficits\par Head: NCAT without abrasions, lacerations, or ecchymosis to head, face, or scalp\par Eyes: No scleral icterus, no gross abnormalities\par Respiratory: Equal chest wall expansion bilaterally, no accessory muscle use\par Lymphatic: No lymphadenopathy palpated\par Skin: Warm and dry\par Psychiatric: Normal mood and affect \par \par Right Shoulder\par \par ROM passive FF 0-160 with pain and crepitus, ER to 40, IR to L1. Motor and sensation are intact distally. Strength forward elevation in scapular plane 5/5, internal rotation 5/5, external rotation 5/5, adduction 5/5 and abduction 5/5. He has full range of motion of all fingers with capillary refill of <2 seconds throughout. She has good nerve function and median nerve, ulnar, radial, PIN, AIN. She has no sensory deficits over the C5-T1 nerve roots. Radial pulses 2+. Able to make an A-OK sign and thumbs up sign: flex/extend thumb, cross middle finger over index.\par \par Full ROM Wrist/ Elbow. \par \par Left shoulder\par ·	Inspection/Palpation: no tenderness, swelling or deformities\par ·	Range of Motion: no crepitus with ROM; Active FF 0 - 80; ER at side 0 - 15; IR to belly ; Passive FF 0 - 90 w/ pain ; ER at side 0 - 20 w/ pain \par ·	Strength: forward elevation in scapular plane 4/5, internal rotation 4/5, external rotation 4/5, adduction 4/5 and abduction 4/5\par ·	Stability: no joint instability on provocative testing\par ·	Tests: Unable to test \par

## 2023-05-15 NOTE — ADDENDUM
[FreeTextEntry1] : Documented by Neha Cardenas acting as a scribe for Dr. Marks and Johnathan Nesbitt PA-C on 05/15/2023.   All medical record entries made by the Scribe were at my, Dr. Marks, and Johnathan Nesbitt's, direction and personally dictated by me on 05/15/2023. I have reviewed the chart and agree that the record accurately reflects my personal performance of the history, physical exam, procedure and imaging.

## 2023-06-12 ENCOUNTER — APPOINTMENT (OUTPATIENT)
Dept: ORTHOPEDIC SURGERY | Facility: CLINIC | Age: 78
End: 2023-06-12
Payer: MEDICARE

## 2023-06-12 VITALS — WEIGHT: 120 LBS | HEIGHT: 61 IN | BODY MASS INDEX: 22.66 KG/M2

## 2023-06-12 DIAGNOSIS — Z96.611 PRESENCE OF RIGHT ARTIFICIAL SHOULDER JOINT: ICD-10-CM

## 2023-06-12 PROCEDURE — 73030 X-RAY EXAM OF SHOULDER: CPT | Mod: 50

## 2023-06-12 PROCEDURE — 99214 OFFICE O/P EST MOD 30 MIN: CPT | Mod: 25

## 2023-06-12 PROCEDURE — 20611 DRAIN/INJ JOINT/BURSA W/US: CPT | Mod: LT

## 2023-06-12 NOTE — ADDENDUM
[FreeTextEntry1] : Documented by Neha Cardenas acting as a scribe for Dr. Marks and Johnathan Nesbitt PA-C on 06/12/2023.   All medical record entries made by the Scribe were at my, Dr. Marks, and Johnathan Nesbitt's, direction and personally dictated by me on 06/12/2023. I have reviewed the chart and agree that the record accurately reflects my personal performance of the history, physical exam, procedure and imaging.

## 2023-06-12 NOTE — DISCUSSION/SUMMARY
[de-identified] : We had a thorough discussion regarding the nature of her pain, the pathophysiology, as well as all treatment options. Pt due to her acute pain elected for an ultrasound guided corticosteroid injection at today's visit and tolerated the procedure well. She should take it easy for the next 2-3 days while icing the joint. Patient was given prescription of formal physical therapy that she will perform 2x/wk for 6-8 wks. If symptoms persist or worsen she should follow up for repeat clinical assessment. All questions were answered and the patient verbalized understanding. The patient is in agreement with this treatment plan.

## 2023-06-12 NOTE — PHYSICAL EXAM
[de-identified] : Physical Exam: General: Well appearing, no acute distress \par Neurologic: A&Ox3, No focal deficits \par Head: NCAT without abrasions, lacerations, or ecchymosis to head, face, or scalp \par Eyes: No scleral icterus, no gross abnormalities \par Respiratory: Equal chest wall expansion bilaterally, no accessory muscle use \par Lymphatic: No lymphadenopathy palpated \par Skin: Warm and dry \par Psychiatric: Normal mood and affect  \par \par Left Shoulder \par · Inspection/Palpation: no tenderness, swelling or deformities \par · Range of Motion: no crepitus with ROM; Active ; ER at side 15; IR to L1; Passive ; ER at side 15; IR to L1 \par · Strength: forward elevation in scapular plane [4/5], internal rotation [4/5], external rotation [4/5], adduction [4/5] and abduction [4/5] \par · Stability: no joint instability on provocative testing \par · Tests: Shultz test positive, Neer positive, positive drop arm test secondary to pain, bear hug test positive, Napolean sign negative, cross arm adduction negative, lift off sign positive, hornblowers sign negative, speeds test negative, Yergason's test negative, no bicipital groove tenderness, Chen's Active Compression test negative, whipple test POS, bicep's load II test negative  \par \par Right Shoulder \par · Inspection/Palpation: no tenderness, swelling or deformities \par · Range of Motion: no crepitus with ROM; Active ; ER at side 25; IR to L4; Passive ; ER at side 35; IR to L4 \par · Strength: forward elevation in scapular plane [4/5], internal rotation [4/5], external rotation [4/5], adduction [4/5] and abduction [4/5] \par · Stability: no joint instability on provocative testing \par · Tests: Shultz test positive, Neer positive, positive drop arm test secondary to pain, bear hug test positive, Napolean sign negative, cross arm adduction negative, lift off sign positive, hornblowers sign negative, speeds test negative, Yergason's test negative, no bicipital groove tenderness, Chen's Active Compression test negative, whipple test POS, bicep's load II test negative  [de-identified] : The following radiographs were ordered and read by me during this patients visit. I reviewed each radiograph in detail with the patient and discussed the findings as highlighted below.   \par \par 4 views of the bilateral shoulder show no fracture, dislocation or bony lesions. There is evidence of degenerative change of the left shoulder in the glenohumeral and acromioclavicular joints with narrowing of the joint space. Otherwise unremarkable.

## 2023-06-12 NOTE — PROCEDURE
[de-identified] : US Guided Injection: left shoulder (Subacromial). \par Indication: Pain/osteoarthritis  \par \par A discussion was had with the patient regarding this procedure and all questions were answered. All risks, benefits and alternatives were discussed. These included but were not limited to bleeding, infection, and allergic reaction. Alcohol was used to clean the skin, and betadine was used to sterilize and prep the area in the posterior aspect of the left  shoulder. Ethyl chloride spray was then used as a topical anesthetic. A 22-gauge needle was used to inject 3cc 1% xylocaine, 2cc 0.25% bupivacaine and 1cc of 40mg/mL triamcinolone acetonide into the left subacromial space. An ultrasound guidance was used for adequate placement of needle.A sterile bandage was then applied. The patient tolerated the procedure well and there were no complications.

## 2023-10-02 ENCOUNTER — APPOINTMENT (OUTPATIENT)
Dept: ORTHOPEDIC SURGERY | Facility: CLINIC | Age: 78
End: 2023-10-02
Payer: MEDICARE

## 2023-10-02 VITALS — HEIGHT: 61 IN | WEIGHT: 120 LBS | BODY MASS INDEX: 22.66 KG/M2

## 2023-10-02 DIAGNOSIS — M75.82 OTHER SHOULDER LESIONS, LEFT SHOULDER: ICD-10-CM

## 2023-10-02 DIAGNOSIS — M12.811 OTHER SPECIFIC ARTHROPATHIES, NOT ELSEWHERE CLASSIFIED, RIGHT SHOULDER: ICD-10-CM

## 2023-10-02 PROCEDURE — 99214 OFFICE O/P EST MOD 30 MIN: CPT | Mod: 25

## 2023-10-02 PROCEDURE — 20611 DRAIN/INJ JOINT/BURSA W/US: CPT | Mod: LT

## 2023-10-02 RX ORDER — LIDOCAINE 5% 700 MG/1
5 PATCH TOPICAL
Qty: 20 | Refills: 2 | Status: ACTIVE | COMMUNITY
Start: 2023-10-02 | End: 1900-01-01

## 2023-10-02 RX ORDER — CYCLOBENZAPRINE HYDROCHLORIDE 10 MG/1
10 TABLET, FILM COATED ORAL 3 TIMES DAILY
Qty: 30 | Refills: 1 | Status: ACTIVE | COMMUNITY
Start: 2023-06-12 | End: 1900-01-01

## 2023-11-06 ENCOUNTER — APPOINTMENT (OUTPATIENT)
Dept: ORTHOPEDIC SURGERY | Facility: CLINIC | Age: 78
End: 2023-11-06

## 2025-06-10 NOTE — H&P PST ADULT - NSCAFFEINETYPE_GEN_ALL_CORE_SD
Please have SV evaluate patient before starting lightbox. Per Dr. Yeung start hands and feet then cover feet, then do whole body. Detail Level: Zone coffee

## 2025-06-30 ENCOUNTER — OFFICE (OUTPATIENT)
Dept: URBAN - METROPOLITAN AREA CLINIC 103 | Facility: CLINIC | Age: 80
Setting detail: OPHTHALMOLOGY
End: 2025-06-30
Payer: MEDICARE

## 2025-06-30 DIAGNOSIS — H25.13: ICD-10-CM

## 2025-06-30 DIAGNOSIS — H40.013: ICD-10-CM

## 2025-06-30 DIAGNOSIS — H25.11: ICD-10-CM

## 2025-06-30 PROBLEM — H35.033 HYPERTENSIVE RETINOPATHY; BOTH EYES: Status: ACTIVE | Noted: 2025-06-30

## 2025-06-30 PROBLEM — H25.12 CATARACT SENILE NUCLEAR SCLEROSIS; RIGHT EYE, LEFT EYE, BOTH EYES: Status: ACTIVE | Noted: 2025-06-30

## 2025-06-30 PROCEDURE — 92136 OPHTHALMIC BIOMETRY: CPT | Mod: TC | Performed by: OPHTHALMOLOGY

## 2025-06-30 PROCEDURE — 92250 FUNDUS PHOTOGRAPHY W/I&R: CPT | Performed by: OPHTHALMOLOGY

## 2025-06-30 PROCEDURE — 92136 OPHTHALMIC BIOMETRY: CPT | Mod: RT | Performed by: OPHTHALMOLOGY

## 2025-06-30 PROCEDURE — 99204 OFFICE O/P NEW MOD 45 MIN: CPT | Performed by: OPHTHALMOLOGY

## 2025-06-30 ASSESSMENT — TONOMETRY
OS_IOP_MMHG: 19
OD_IOP_MMHG: 18
OS_IOP_MMHG: 18
OD_IOP_MMHG: 18

## 2025-06-30 ASSESSMENT — CONFRONTATIONAL VISUAL FIELD TEST (CVF)
OD_FINDINGS: FULL
OS_FINDINGS: FULL

## 2025-07-01 ASSESSMENT — VISUAL ACUITY
OD_BCVA: 20/60-2
OS_BCVA: 20/60

## 2025-07-01 ASSESSMENT — KERATOMETRY
OS_K1POWER_DIOPTERS: 44.25
OD_K2POWER_DIOPTERS: 44.75
OS_AXISANGLE_DEGREES: 140
OD_AXISANGLE_DEGREES: 016
OD_K1POWER_DIOPTERS: 44.00
OS_K2POWER_DIOPTERS: 44.75

## 2025-07-01 ASSESSMENT — REFRACTION_CURRENTRX
OS_CYLINDER: -1.25
OS_OVR_VA: 20/
OS_AXIS: 070
OD_VPRISM_DIRECTION: PROGS
OS_SPHERE: +3.50
OD_CYLINDER: -1.00
OS_VPRISM_DIRECTION: PROGS
OD_ADD: +3.00
OD_VPRISM_DIRECTION: BF
OD_CYLINDER: -0.50
OD_AXIS: 102
OD_SPHERE: +2.50
OS_SPHERE: +3.50
OD_SPHERE: +3.50
OD_OVR_VA: 20/
OS_OVR_VA: 20/
OD_ADD: +3.00
OS_AXIS: 87
OD_OVR_VA: 20/
OS_ADD: +3.00
OD_AXIS: 084
OS_CYLINDER: -1.25
OS_VPRISM_DIRECTION: BF
OS_ADD: +3.00

## 2025-07-01 ASSESSMENT — REFRACTION_MANIFEST
OS_CYLINDER: -1.25
OS_ADD: +2.50
OS_AXIS: 65
OS_SPHERE: +3.00
OS_SPHERE: +3.50
OD_VA1: 20/30-
OD_AXIS: 85
OS_VA1: 20/60
OD_AXIS: 078
OS_AXIS: 082
OS_VA1: 20/30--2
OD_CYLINDER: -0.75
OD_CYLINDER: -1.25
OD_ADD: +2.50
OD_SPHERE: +3.00
OD_SPHERE: +3.50
OD_VA1: 20/60
OS_CYLINDER: -1.00

## 2025-08-01 ENCOUNTER — ASC (OUTPATIENT)
Dept: URBAN - METROPOLITAN AREA SURGERY 8 | Facility: SURGERY | Age: 80
Setting detail: OPHTHALMOLOGY
End: 2025-08-01
Payer: MEDICARE

## 2025-08-01 DIAGNOSIS — H25.11: ICD-10-CM

## 2025-08-01 PROCEDURE — 66984 XCAPSL CTRC RMVL W/O ECP: CPT | Mod: RT | Performed by: OPHTHALMOLOGY

## 2025-08-02 ENCOUNTER — RX ONLY (RX ONLY)
Age: 80
End: 2025-08-02

## 2025-08-02 ENCOUNTER — OFFICE (OUTPATIENT)
Dept: URBAN - METROPOLITAN AREA CLINIC 94 | Facility: CLINIC | Age: 80
Setting detail: OPHTHALMOLOGY
End: 2025-08-02
Payer: MEDICARE

## 2025-08-02 DIAGNOSIS — Z96.1: ICD-10-CM

## 2025-08-02 PROCEDURE — 99024 POSTOP FOLLOW-UP VISIT: CPT | Performed by: OPTOMETRIST

## 2025-08-02 ASSESSMENT — REFRACTION_CURRENTRX
OS_OVR_VA: 20/
OS_AXIS: 87
OS_OVR_VA: 20/
OS_CYLINDER: -1.25
OD_OVR_VA: 20/
OS_AXIS: 070
OS_VPRISM_DIRECTION: BF
OD_AXIS: 084
OD_VPRISM_DIRECTION: PROGS
OD_ADD: +3.00
OS_ADD: +3.00
OD_OVR_VA: 20/
OD_CYLINDER: -0.50
OS_SPHERE: +3.50
OD_AXIS: 102
OS_ADD: +3.00
OD_SPHERE: +2.50
OD_VPRISM_DIRECTION: BF
OS_VPRISM_DIRECTION: PROGS
OD_CYLINDER: -1.00
OD_ADD: +3.00
OS_CYLINDER: -1.25
OS_SPHERE: +3.50
OD_SPHERE: +3.50

## 2025-08-02 ASSESSMENT — TONOMETRY
OD_IOP_MMHG: 18
OS_IOP_MMHG: 18

## 2025-08-02 ASSESSMENT — REFRACTION_AUTOREFRACTION
OS_AXIS: 082
OS_SPHERE: +3.00
OD_SPHERE: +3.50
OD_AXIS: 078
OD_CYLINDER: -1.25
OS_CYLINDER: -1.00

## 2025-08-02 ASSESSMENT — DECREASING TEAR LAKE - SEVERITY SCORE
OS_DEC_TEARLAKE: 1+ 2+
OD_DEC_TEARLAKE: 1+ 2+

## 2025-08-02 ASSESSMENT — REFRACTION_MANIFEST
OS_AXIS: 082
OS_CYLINDER: -1.25
OD_CYLINDER: -0.75
OD_AXIS: 078
OS_VA1: 20/60
OS_SPHERE: +3.50
OD_ADD: +2.50
OS_CYLINDER: -1.00
OD_VA1: 20/60
OD_VA1: 20/30-
OD_SPHERE: +3.00
OS_ADD: +2.50
OS_VA1: 20/30--2
OD_AXIS: 85
OS_SPHERE: +3.00
OD_SPHERE: +3.50
OD_CYLINDER: -1.25
OS_AXIS: 65

## 2025-08-02 ASSESSMENT — KERATOMETRY
OD_K2POWER_DIOPTERS: 44.75
OD_AXISANGLE_DEGREES: 016
OS_AXISANGLE_DEGREES: 140
OS_K2POWER_DIOPTERS: 44.75
OD_K1POWER_DIOPTERS: 44.00
OS_K1POWER_DIOPTERS: 44.25

## 2025-08-02 ASSESSMENT — TEAR BREAK UP TIME (TBUT)
OS_TBUT: 6-7 SEC
OD_TBUT: 6-7 SEC

## 2025-08-02 ASSESSMENT — LID EXAM ASSESSMENTS
OD_BLEPHARITIS: T
OS_BLEPHARITIS: T

## 2025-08-02 ASSESSMENT — DRY EYES - PHYSICIAN NOTES
OD_GENERALCOMMENTS: NO STAINING
OS_GENERALCOMMENTS: NO STAINING

## 2025-08-02 ASSESSMENT — CORNEAL EDEMA CLINICAL DESCRIPTION: OD_CORNEALEDEMA: 1+

## 2025-08-02 ASSESSMENT — CONFRONTATIONAL VISUAL FIELD TEST (CVF)
OD_FINDINGS: FULL
OS_FINDINGS: FULL

## 2025-08-02 ASSESSMENT — VISUAL ACUITY
OS_BCVA: 20/40
OD_BCVA: 20/200

## 2025-08-04 ENCOUNTER — OFFICE (OUTPATIENT)
Dept: URBAN - METROPOLITAN AREA CLINIC 94 | Facility: CLINIC | Age: 80
Setting detail: OPHTHALMOLOGY
End: 2025-08-04
Payer: MEDICARE

## 2025-08-04 DIAGNOSIS — Z96.1: ICD-10-CM

## 2025-08-04 PROCEDURE — 99024 POSTOP FOLLOW-UP VISIT: CPT | Performed by: PHYSICIAN ASSISTANT

## 2025-08-04 ASSESSMENT — REFRACTION_AUTOREFRACTION
OD_SPHERE: +1.75
OD_CYLINDER: -1.50
OD_AXIS: 092
OS_AXIS: 080
OS_CYLINDER: -1.50
OS_SPHERE: +3.75

## 2025-08-04 ASSESSMENT — KERATOMETRY
OD_K1POWER_DIOPTERS: 43.25
OS_K2POWER_DIOPTERS: 44.75
OD_AXISANGLE_DEGREES: 006
OS_K1POWER_DIOPTERS: 44.00
OD_K2POWER_DIOPTERS: 44.75
OS_AXISANGLE_DEGREES: 145

## 2025-08-04 ASSESSMENT — DRY EYES - PHYSICIAN NOTES
OD_GENERALCOMMENTS: NO STAINING
OS_GENERALCOMMENTS: NO STAINING

## 2025-08-04 ASSESSMENT — REFRACTION_CURRENTRX
OD_ADD: +3.00
OS_SPHERE: +3.50
OS_AXIS: 87
OS_CYLINDER: -1.25
OS_ADD: +3.00
OS_OVR_VA: 20/
OD_OVR_VA: 20/
OS_ADD: +3.00
OD_OVR_VA: 20/
OS_OVR_VA: 20/
OS_VPRISM_DIRECTION: PROGS
OS_VPRISM_DIRECTION: BF
OD_ADD: +3.00
OD_CYLINDER: -0.50
OS_AXIS: 070
OD_VPRISM_DIRECTION: BF
OD_SPHERE: +3.50
OS_CYLINDER: -1.25
OD_SPHERE: +2.50
OD_VPRISM_DIRECTION: PROGS
OD_AXIS: 102
OS_SPHERE: +3.50
OD_CYLINDER: -1.00
OD_AXIS: 084

## 2025-08-04 ASSESSMENT — REFRACTION_MANIFEST
OD_CYLINDER: -1.25
OD_SPHERE: +1.50
OS_CYLINDER: -1.25
OS_SPHERE: +3.00
OD_SPHERE: +3.00
OD_VA1: 20/30-
OS_AXIS: 65
OS_ADD: +2.50
OS_AXIS: 082
OD_AXIS: 090
OD_CYLINDER: -0.75
OD_ADD: +2.50
OS_SPHERE: +3.50
OD_VA1: 20/20
OD_AXIS: 85
OS_VA1: 20/60
OS_CYLINDER: -1.00
OS_VA1: 20/30--2

## 2025-08-04 ASSESSMENT — TONOMETRY
OS_IOP_MMHG: 19
OD_IOP_MMHG: 18
OD_IOP_MMHG: 21

## 2025-08-04 ASSESSMENT — DECREASING TEAR LAKE - SEVERITY SCORE
OD_DEC_TEARLAKE: 1+ 2+
OS_DEC_TEARLAKE: 1+ 2+

## 2025-08-04 ASSESSMENT — LID EXAM ASSESSMENTS
OD_BLEPHARITIS: T
OS_BLEPHARITIS: T

## 2025-08-04 ASSESSMENT — TEAR BREAK UP TIME (TBUT)
OS_TBUT: 6-7 SEC
OD_TBUT: 6-7 SEC

## 2025-08-04 ASSESSMENT — VISUAL ACUITY
OD_BCVA: 20/70
OS_BCVA: 20/40+1

## 2025-08-04 ASSESSMENT — CONFRONTATIONAL VISUAL FIELD TEST (CVF)
OD_FINDINGS: FULL
OS_FINDINGS: FULL

## 2025-08-08 ENCOUNTER — OFFICE (OUTPATIENT)
Dept: URBAN - METROPOLITAN AREA CLINIC 94 | Facility: CLINIC | Age: 80
Setting detail: OPHTHALMOLOGY
End: 2025-08-08
Payer: MEDICARE

## 2025-08-08 DIAGNOSIS — H25.12: ICD-10-CM

## 2025-08-08 DIAGNOSIS — Z96.1: ICD-10-CM

## 2025-08-08 PROCEDURE — 99024 POSTOP FOLLOW-UP VISIT: CPT | Performed by: PHYSICIAN ASSISTANT

## 2025-08-08 ASSESSMENT — KERATOMETRY
OS_AXISANGLE_DEGREES: 161
OD_K1POWER_DIOPTERS: 43.75
OS_K2POWER_DIOPTERS: 44.75
OD_AXISANGLE_DEGREES: 178
OD_K2POWER_DIOPTERS: 45.00
OS_K1POWER_DIOPTERS: 43.50

## 2025-08-08 ASSESSMENT — REFRACTION_CURRENTRX
OD_SPHERE: +3.50
OS_ADD: +3.00
OS_CYLINDER: -1.25
OD_ADD: +3.00
OS_ADD: +3.00
OD_OVR_VA: 20/
OS_OVR_VA: 20/
OD_AXIS: 102
OD_ADD: +3.00
OS_VPRISM_DIRECTION: PROGS
OD_AXIS: 084
OS_AXIS: 87
OS_CYLINDER: -1.25
OS_SPHERE: +3.50
OD_VPRISM_DIRECTION: PROGS
OD_OVR_VA: 20/
OD_CYLINDER: -0.50
OS_VPRISM_DIRECTION: BF
OS_AXIS: 070
OS_OVR_VA: 20/
OD_CYLINDER: -1.00
OD_VPRISM_DIRECTION: BF
OD_SPHERE: +2.50
OS_SPHERE: +3.50

## 2025-08-08 ASSESSMENT — REFRACTION_MANIFEST
OD_VA1: 20/20
OD_AXIS: 090
OD_SPHERE: +3.00
OD_AXIS: 85
OD_SPHERE: +1.25
OD_AXIS: 090
OD_ADD: +2.50
OS_AXIS: 65
OS_CYLINDER: -1.25
OS_VA1: 20/30--2
OD_CYLINDER: -0.75
OS_SPHERE: +3.50
OD_CYLINDER: -1.25
OS_AXIS: 082
OS_CYLINDER: -1.00
OS_SPHERE: +3.00
OD_VA1: 20/30-
OD_CYLINDER: -1.50
OD_VA1: 20/20
OS_ADD: +2.50
OD_SPHERE: +1.50
OS_VA1: 20/60

## 2025-08-08 ASSESSMENT — VISUAL ACUITY
OS_BCVA: 20/40-1
OD_BCVA: 20/40-1

## 2025-08-08 ASSESSMENT — DECREASING TEAR LAKE - SEVERITY SCORE
OS_DEC_TEARLAKE: 1+ 2+
OD_DEC_TEARLAKE: 1+ 2+

## 2025-08-08 ASSESSMENT — LID EXAM ASSESSMENTS
OD_BLEPHARITIS: T
OS_BLEPHARITIS: T

## 2025-08-08 ASSESSMENT — DRY EYES - PHYSICIAN NOTES
OS_GENERALCOMMENTS: NO STAINING
OD_GENERALCOMMENTS: NO STAINING

## 2025-08-08 ASSESSMENT — TONOMETRY
OS_IOP_MMHG: 20
OD_IOP_MMHG: 20

## 2025-08-08 ASSESSMENT — REFRACTION_AUTOREFRACTION
OS_CYLINDER: -1.00
OD_SPHERE: +1.25
OS_AXIS: 076
OS_SPHERE: +3.50
OD_AXIS: 092
OD_CYLINDER: -1.50

## 2025-08-08 ASSESSMENT — TEAR BREAK UP TIME (TBUT)
OD_TBUT: 6-7 SEC
OS_TBUT: 6-7 SEC

## 2025-08-08 ASSESSMENT — CONFRONTATIONAL VISUAL FIELD TEST (CVF)
OS_FINDINGS: FULL
OD_FINDINGS: FULL

## 2025-08-15 ENCOUNTER — ASC (OUTPATIENT)
Dept: URBAN - METROPOLITAN AREA SURGERY 8 | Facility: SURGERY | Age: 80
Setting detail: OPHTHALMOLOGY
End: 2025-08-15
Payer: MEDICARE

## 2025-08-15 DIAGNOSIS — H25.12: ICD-10-CM

## 2025-08-15 PROCEDURE — 66984 XCAPSL CTRC RMVL W/O ECP: CPT | Mod: 79,LT | Performed by: OPHTHALMOLOGY

## 2025-08-16 ENCOUNTER — RX ONLY (RX ONLY)
Age: 80
End: 2025-08-16

## 2025-08-16 ENCOUNTER — OFFICE (OUTPATIENT)
Dept: URBAN - METROPOLITAN AREA CLINIC 94 | Facility: CLINIC | Age: 80
Setting detail: OPHTHALMOLOGY
End: 2025-08-16
Payer: MEDICARE

## 2025-08-16 DIAGNOSIS — Z96.1: ICD-10-CM

## 2025-08-16 PROBLEM — H25.12 CATARACT SENILE NUCLEAR SCLEROSIS; LEFT EYE,: Status: RESOLVED | Noted: 2025-08-02 | Resolved: 2025-08-16

## 2025-08-16 PROCEDURE — 99024 POSTOP FOLLOW-UP VISIT: CPT | Performed by: OPTOMETRIST

## 2025-08-16 ASSESSMENT — REFRACTION_MANIFEST
OS_ADD: +2.50
OD_ADD: +2.50
OS_CYLINDER: -1.00
OS_SPHERE: +3.50
OS_VA1: 20/60
OD_VA1: 20/20
OD_CYLINDER: -1.25
OS_VA1: 20/30--2
OD_CYLINDER: -0.75
OD_VA1: 20/20
OD_SPHERE: +3.00
OD_VA1: 20/30-
OD_AXIS: 090
OS_SPHERE: +3.00
OS_AXIS: 65
OS_AXIS: 082
OD_SPHERE: +1.50
OD_CYLINDER: -1.50
OD_AXIS: 090
OD_AXIS: 85
OS_CYLINDER: -1.25
OD_SPHERE: +1.25

## 2025-08-16 ASSESSMENT — KERATOMETRY
OS_K2POWER_DIOPTERS: 45.25
OD_K2POWER_DIOPTERS: 44.50
OS_K1POWER_DIOPTERS: 44.00
OS_AXISANGLE_DEGREES: 162
OD_AXISANGLE_DEGREES: 173
OD_K1POWER_DIOPTERS: 44.00

## 2025-08-16 ASSESSMENT — TEAR BREAK UP TIME (TBUT)
OS_TBUT: 6-7 SEC
OD_TBUT: 6-7 SEC

## 2025-08-16 ASSESSMENT — REFRACTION_CURRENTRX
OS_VPRISM_DIRECTION: PROGS
OD_SPHERE: +2.50
OD_CYLINDER: -0.50
OD_OVR_VA: 20/
OD_VPRISM_DIRECTION: PROGS
OD_OVR_VA: 20/
OS_ADD: +3.00
OS_VPRISM_DIRECTION: BF
OS_ADD: +3.00
OD_AXIS: 102
OS_CYLINDER: -1.25
OD_AXIS: 084
OD_ADD: +3.00
OS_AXIS: 070
OD_SPHERE: +3.50
OS_AXIS: 87
OS_SPHERE: +3.50
OD_ADD: +3.00
OS_OVR_VA: 20/
OD_CYLINDER: -1.00
OS_CYLINDER: -1.25
OS_OVR_VA: 20/
OS_SPHERE: +3.50
OD_VPRISM_DIRECTION: BF

## 2025-08-16 ASSESSMENT — DRY EYES - PHYSICIAN NOTES
OD_GENERALCOMMENTS: NO STAINING
OS_GENERALCOMMENTS: NO STAINING

## 2025-08-16 ASSESSMENT — TONOMETRY
OS_IOP_MMHG: 21
OD_IOP_MMHG: 15

## 2025-08-16 ASSESSMENT — REFRACTION_AUTOREFRACTION
OS_AXIS: 077
OD_AXIS: 092
OS_CYLINDER: -1.00
OD_CYLINDER: -1.25
OS_SPHERE: 0.00
OD_SPHERE: +1.25

## 2025-08-16 ASSESSMENT — CONFRONTATIONAL VISUAL FIELD TEST (CVF)
OS_FINDINGS: FULL
OD_FINDINGS: FULL

## 2025-08-16 ASSESSMENT — LID EXAM ASSESSMENTS
OD_BLEPHARITIS: T
OS_BLEPHARITIS: T

## 2025-08-16 ASSESSMENT — CORNEAL EDEMA CLINICAL DESCRIPTION: OS_CORNEALEDEMA: 1+

## 2025-08-16 ASSESSMENT — DECREASING TEAR LAKE - SEVERITY SCORE
OD_DEC_TEARLAKE: 1+ 2+
OS_DEC_TEARLAKE: 1+ 2+

## 2025-08-16 ASSESSMENT — VISUAL ACUITY
OS_BCVA: 20/40
OD_BCVA: 20/80-